# Patient Record
Sex: FEMALE | Race: WHITE | Employment: FULL TIME | URBAN - METROPOLITAN AREA
[De-identification: names, ages, dates, MRNs, and addresses within clinical notes are randomized per-mention and may not be internally consistent; named-entity substitution may affect disease eponyms.]

---

## 2017-09-22 ENCOUNTER — OFFICE VISIT (OUTPATIENT)
Dept: OBGYN | Facility: OTHER | Age: 32
End: 2017-09-22
Payer: COMMERCIAL

## 2017-09-22 VITALS
DIASTOLIC BLOOD PRESSURE: 80 MMHG | HEIGHT: 70 IN | SYSTOLIC BLOOD PRESSURE: 118 MMHG | HEART RATE: 88 BPM | WEIGHT: 216 LBS | BODY MASS INDEX: 30.92 KG/M2

## 2017-09-22 DIAGNOSIS — Z30.432 ENCOUNTER FOR IUD REMOVAL: ICD-10-CM

## 2017-09-22 DIAGNOSIS — Z23 NEED FOR PROPHYLACTIC VACCINATION AND INOCULATION AGAINST INFLUENZA: ICD-10-CM

## 2017-09-22 DIAGNOSIS — Z00.00 ROUTINE GENERAL MEDICAL EXAMINATION AT A HEALTH CARE FACILITY: Primary | ICD-10-CM

## 2017-09-22 PROCEDURE — G0145 SCR C/V CYTO,THINLAYER,RESCR: HCPCS | Performed by: OBSTETRICS & GYNECOLOGY

## 2017-09-22 PROCEDURE — 90686 IIV4 VACC NO PRSV 0.5 ML IM: CPT | Performed by: OBSTETRICS & GYNECOLOGY

## 2017-09-22 PROCEDURE — 90471 IMMUNIZATION ADMIN: CPT | Performed by: OBSTETRICS & GYNECOLOGY

## 2017-09-22 PROCEDURE — 87624 HPV HI-RISK TYP POOLED RSLT: CPT | Performed by: OBSTETRICS & GYNECOLOGY

## 2017-09-22 PROCEDURE — 58301 REMOVE INTRAUTERINE DEVICE: CPT | Performed by: OBSTETRICS & GYNECOLOGY

## 2017-09-22 PROCEDURE — 99395 PREV VISIT EST AGE 18-39: CPT | Mod: 25 | Performed by: OBSTETRICS & GYNECOLOGY

## 2017-09-22 RX ORDER — PRENATAL VIT/IRON FUM/FOLIC AC 27MG-0.8MG
1 TABLET ORAL DAILY
COMMUNITY
End: 2017-09-22

## 2017-09-22 ASSESSMENT — PAIN SCALES - GENERAL: PAINLEVEL: NO PAIN (0)

## 2017-09-22 NOTE — PROGRESS NOTES
Injectable Influenza Immunization Documentation    1.  Is the person to be vaccinated sick today?   No    2. Does the person to be vaccinated have an allergy to a component   of the vaccine?   No    3. Has the person to be vaccinated ever had a serious reaction   to influenza vaccine in the past?   No    4. Has the person to be vaccinated ever had Guillain-Barré syndrome?   No    Form completed by QIANA

## 2017-09-22 NOTE — MR AVS SNAPSHOT
After Visit Summary   9/22/2017    Julianne Jenkins    MRN: [id number]           Patient Information     Date Of Birth          1985        Visit Information        Provider Department      9/22/2017 1:45 PM Swathi Crisostomo, DO [facility]        Today's Diagnoses     Routine general medical examination at a health care facility    -  1    Encounter for IUD removal          Care Instructions    PREVENTIVE HEALTH RECOMMENDATIONS:   Get a physical every year.  A pap test is important to have done starting at age 21 and then every three years as long as your pap is normal.  When you receive the results of your pap test it will include when you need to have your next pap test.    You should be tested each year for chlamydia and gonorrhea if you are aged 16-25 and if you have had a new sexual partner since you were last tested.   Vaccines: Get a flu shot each year.   Eat at least 8-10 servings of fruits and vegetables daily.  Eat whole-grain bread and cereal, whole-wheat pasta and brown rice instead of white grains and white rice.   For bone health: Eat calcium-rich foods (dairy products) or take calcium pills (500 to 600 mg with vitamin D) twice a day with food.   Exercise for an average of 30 minutes a day, 5 days of the week. It can be as simple as taking a brisk walk.  This will help you control your weight and prevent many diseases.   Limit alcohol to one drink per day.   Don't smoke and limit your exposure to second hand smoke.  If you smoke consider making a plan to quit. Go to [employer] and clink on   [employer]  for help   Wear sunscreen with at least SPF15 to prevent skin damage and skin cancer.   Brush your teeth twice a day and floss once a day and see your dentist twice a year for an exam and cleaning.   Have a great year and I will look forward to seeing you next year.   Swathi Crisostomo, DO          Follow-ups after your visit        Follow-up notes from your  "care team     Return in about 1 year (around 9/22/2018).      Who to contact     If you have questions or need follow up information about today's clinic visit or your schedule please contact [facility] directly at [phone].  Normal or non-critical lab and imaging results will be communicated to you by MyChart, letter or phone within 4 business days after the clinic has received the results. If you do not hear from us within 7 days, please contact the clinic through Osmosishart or phone. If you have a critical or abnormal lab result, we will notify you by phone as soon as possible.  Submit refill requests through AppDirect or call your pharmacy and they will forward the refill request to us. Please allow 3 business days for your refill to be completed.          Additional Information About Your Visit        Osmosishart Information     AppDirect gives you secure access to your electronic health record. If you see a primary care provider, you can also send messages to your care team and make appointments. If you have questions, please call your primary care clinic.  If you do not have a primary care provider, please call [phone] and they will assist you.        Care EveryWhere ID     This is your Care EveryWhere ID. This could be used by other organizations to access your [facility] medical records  [id number]        Your Vitals Were     Pulse Height BMI (Body Mass Index)             88 5' 9.5\" (1.765 m) 31.44 kg/m2          Blood Pressure from Last 3 Encounters:   09/22/17 118/80   07/22/16 102/78   08/20/15 124/85    Weight from Last 3 Encounters:   09/22/17 216 lb (98 kg)   07/22/16 205 lb (93 kg)   02/12/15 229 lb (103.9 kg)              We Performed the Following     Pap imaged thin layer screen with HPV - recommended age 30 - 65 years (select HPV order below)     REMOVE INTRAUTERINE DEVICE        Primary Care Provider    None Specified       No primary provider on file.        Equal Access to " Services     [facility]: Hadii aad ku hadlencruzito [employer], waaxda luqadaha, qaybta kaalmamarcus young, augustine puente. So [facility] [phone].    ATENCIÓN: Si habla español, tiene a clayton disposición servicios gratuitos de asistencia lingüística. Llame al [phone].    We comply with applicable federal civil rights laws and [place] laws. We do not discriminate on the basis of race, color, national origin, age, disability sex, sexual orientation or gender identity.            Thank you!     Thank you for choosing [facility]  for your care. Our goal is always to provide you with excellent care. Hearing back from our patients is one way we can continue to improve our services. Please take a few minutes to complete the written survey that you may receive in the mail after your visit with us. Thank you!             Your Updated Medication List - Protect others around you: Learn how to safely use, store and throw away your medicines at www.disposemymeds.org.          This list is accurate as of: 9/22/17  2:27 PM.  Always use your most recent med list.                   Brand Name Dispense Instructions for use Diagnosis    PRENATAL VITAMIN PO           TUMS PO           TYLENOL 325 MG tablet   Generic drug:  acetaminophen      Take 325-650 mg by mouth every 6 hours as needed        valACYclovir 500 MG tablet    VALTREX    90 tablet    Take 1 tablet (500 mg) by mouth daily    Recurrent cold sores       VITAMIN B 12 PO      Take 1,000 mcg by mouth        ZANTAC PO

## 2017-09-22 NOTE — PROGRESS NOTES
Chief Complaint   Patient presents with     Physical     IUD removal--  Pap -  annual diagnostic ??-- offer flu inj       Subjective  Julianne Jenkins is a 32 year old female who presents for her annual exam.  Patient states she is doing well.  She is ready for another baby so wants her Paragard IUD out today.  She has not been able to feel her strings but an ultrasound last year showed the IUD to be in the correct location.  Patient states her menses are heavy for 3 days.  Her menses are regular, monthly.  Her last menstrual period was last week.  No problems urinating.  Normal bowel movements.  No fever or chills.  Patient is sexually active.  No dyspareunia.  No vaginal spotting after.  Patient wants the flu vaccine.      Most recent pap: 2016  History of abnormal Pap smear:  History of leep  History of STI's: No  History of PID:  No    Family history of uterine cancer:  No  Family history of ovarian cancer:  Maternal aunts  Family history of colon cancer:   No  Family history of breast cancer:  No    ROS  ROS: 10 point ROS neg other than the symptoms noted above in the HPI.    Past Medical History:   Diagnosis Date     ASCUS with positive high risk HPV 11/26/13     ASCUS with positive high risk HPV 11/21/14     H/O colposcopy with cervical biopsy 3/5/13    MYNOR 1     H/O colposcopy with cervical biopsy 12/22/14    MYONR 3     LSIL (low grade squamous intraepithelial lesion) on Pap smear 7/19/10     LSIL (low grade squamous intraepithelial lesion) on Pap smear 2/19/13    cannot rule out HSIL     Past Surgical History:   Procedure Laterality Date     APPENDECTOMY       COSMETIC SURGERY  2010    Molars      LEEP TX, CERVICAL  1/13/15    MYNOR 3 without extension to margins     History reviewed. No pertinent family history.  Social History   Substance Use Topics     Smoking status: Former Smoker     Smokeless tobacco: Never Used     Alcohol use No       Tobacco abuse:  No  Do you get at least three servings of calcium  "containing foods daily (dairy, green leafy vegetables, etc.)? yes   Outside of work or daily activities, how many days per week do you exercise for 30 minutes or longer? 3-4x per week  The patient does not drink >3 drinks per day nor >7 drinks per week.   Have you had an eye exam in the past two years? yes   Do you see a dentist twice per year? yes   Today's PHQ-2 Score:   Abuse: Current or Past(Physical, Sexual or Emotional)- No   Do you feel safe in your environment - Yes  Objective  Vitals: /80  Pulse 88  Ht 5' 9.5\" (1.765 m)  Wt 216 lb (98 kg)  BMI 31.44 kg/m2  BMI= Body mass index is 31.44 kg/(m^2).    General appearance=no deformities noted  Psych=mood is stable  Skin=no rashes or lesions seen  Breast:  Benign exam, no masses palpated.  No skin changes, no axillary lymphadenopathy, no nipple discharge.  Axilla feel completely normal, no lymph node enlargement and non-tender.  Neck=overall appearance is normal  Heart=RRR, no murmurs, no swelling noted  Thyroid=normal, no masses, no TTP, no enlargement  Lungs=non-labored breathing, no use of accessory muscles, clear to ausculation bilaterally  Abd=soft, Nontender/nondistended, +bowel sounds x4, no masses, no signs of hernias, no evidence of hepatosplenomegaly  PELVIC:    External genitalia: normal without lesions or masses  Urethral meatus: no lesions or prolapse noted, normal size  Urethra: no masses, non tender  Bladder: non tender, no fullness  Vagina: normal mucosa and rugae, no discharge.  Cervix: normal without lesion, no cervical motion tenderness, healthy, multiparous, pap smear obtained  Uterus: small, mobile, nontender.  Adnexa: non tender, without masses  Rectal: deffered  Ext=no clubbing or cyanosis, no swelling      Last lipid profile: 2016  Regular self breast exam:  Yes  Most recent mammogram:  2016  History of abnormal mammogram:  No  Fit testing:  No  DEXA:  No    Procedure:  Speculum placed.  IUD strings grabbed with ring forceps.  " Removed intact.  No problems encountered.        Assessment/Plan  1.)  Annual/well woman exam=pap smear  2.)  Fertility desired=remove IUD    The following topics were discussed or recommended   Discussed seat belt, helmet and sunscreen use  Vision screening   Calcium/Vitamin D supplement=Recommended 1000 mg of calcium daily and 800 IU of vitamin D.    25 minutes was spent face to face with the patient today discussing her history, diagnosis, and follow-up plan as noted above.  Over 50% of the visit was spent in counseling and coordination of care.    Total Visit Time: 30 minutes        Swathi Crisostomo

## 2017-09-22 NOTE — PATIENT INSTRUCTIONS
PREVENTIVE HEALTH RECOMMENDATIONS:   Get a physical every year.  A pap test is important to have done starting at age 21 and then every three years as long as your pap is normal.  When you receive the results of your pap test it will include when you need to have your next pap test.    You should be tested each year for chlamydia and gonorrhea if you are aged 16-25 and if you have had a new sexual partner since you were last tested.   Vaccines: Get a flu shot each year.   Eat at least 8-10 servings of fruits and vegetables daily.  Eat whole-grain bread and cereal, whole-wheat pasta and brown rice instead of white grains and white rice.   For bone health: Eat calcium-rich foods (dairy products) or take calcium pills (500 to 600 mg with vitamin D) twice a day with food.   Exercise for an average of 30 minutes a day, 5 days of the week. It can be as simple as taking a brisk walk.  This will help you control your weight and prevent many diseases.   Limit alcohol to one drink per day.   Don't smoke and limit your exposure to second hand smoke.  If you smoke consider making a plan to quit. Go to [employer] and clink on   [employer]  for help   Wear sunscreen with at least SPF15 to prevent skin damage and skin cancer.   Brush your teeth twice a day and floss once a day and see your dentist twice a year for an exam and cleaning.   Have a great year and I will look forward to seeing you next year.   Swathi Crisostomo, DO

## 2017-09-22 NOTE — NURSING NOTE
"Chief Complaint   Patient presents with     Physical     IUD removal--  Pap -  annual diagnostic ??-- offer flu inj       Initial /80  Pulse 88  Ht 5' 9.5\" (1.765 m)  Wt 216 lb (98 kg)  BMI 31.44 kg/m2 Estimated body mass index is 31.44 kg/(m^2) as calculated from the following:    Height as of this encounter: 5' 9.5\" (1.765 m).    Weight as of this encounter: 216 lb (98 kg).  Medication Reconciliation:complete  "

## 2017-09-26 LAB
COPATH REPORT: NORMAL
PAP: NORMAL

## 2017-09-28 LAB
FINAL DIAGNOSIS: NORMAL
HPV HR 12 DNA CVX QL NAA+PROBE: NEGATIVE
HPV16 DNA SPEC QL NAA+PROBE: NEGATIVE
HPV18 DNA SPEC QL NAA+PROBE: NEGATIVE
SPECIMEN DESCRIPTION: NORMAL

## 2018-06-07 ENCOUNTER — OFFICE VISIT (OUTPATIENT)
Dept: URGENT CARE | Facility: RETAIL CLINIC | Age: 33
End: 2018-06-07
Payer: COMMERCIAL

## 2018-06-07 VITALS
BODY MASS INDEX: 30.3 KG/M2 | HEART RATE: 76 BPM | SYSTOLIC BLOOD PRESSURE: 120 MMHG | WEIGHT: 208.2 LBS | DIASTOLIC BLOOD PRESSURE: 81 MMHG | TEMPERATURE: 98.9 F

## 2018-06-07 DIAGNOSIS — Z32.01 PREGNANCY TEST POSITIVE: Primary | ICD-10-CM

## 2018-06-07 DIAGNOSIS — N92.6 MISSED MENSES: ICD-10-CM

## 2018-06-07 DIAGNOSIS — R11.0 NAUSEA: ICD-10-CM

## 2018-06-07 LAB — HCG UR QL: POSITIVE

## 2018-06-07 PROCEDURE — 99213 OFFICE O/P EST LOW 20 MIN: CPT | Performed by: PHYSICIAN ASSISTANT

## 2018-06-07 PROCEDURE — 81025 URINE PREGNANCY TEST: CPT | Performed by: PHYSICIAN ASSISTANT

## 2018-06-07 RX ORDER — ONDANSETRON 8 MG/1
TABLET, FILM COATED ORAL
Refills: 0 | COMMUNITY
Start: 2018-04-04 | End: 2018-12-20

## 2018-06-07 RX ORDER — SUMATRIPTAN 50 MG/1
TABLET, FILM COATED ORAL
Refills: 0 | COMMUNITY
Start: 2018-04-04 | End: 2020-01-22

## 2018-06-07 RX ORDER — PROCHLORPERAZINE MALEATE 10 MG
10 TABLET ORAL
COMMUNITY
Start: 2018-04-05 | End: 2018-08-17

## 2018-06-07 NOTE — LETTER
[facility]  [address] [address] [address]      June 7, 2018    [facility]  [address] [place] [address]        To whom it may concern:    Julianne was seen at our clinic today. Please excuse her from work missed June 6 and June 7.        Sincerely,        Marquita Bazzi PA-C

## 2018-06-07 NOTE — PROGRESS NOTES
Chief Complaint   Patient presents with     Nausea     nausea and abdominal cramping, diarrhea sat and , a lot gas, body aches yesterday, no fevers, wants a preganacy test today, may 7th first day of last period     SUBJECTIVE:  Julianne Jenkins is a 32 year old female here today for a pregnancy test.  LMP: May 7, 2018  Wt: 208.2. She and her  have been trying to get pregnant.  Symptoms include: absence of menses and nausea, some abdominal cramping, malodorous flatus, diarrhea.  Pregnancy planning discussed: pre-conception care, nutrition, smoking, alcohol & drug use and pre- vitamins    Past Medical History:   Diagnosis Date     ASCUS with positive high risk HPV 13     ASCUS with positive high risk HPV 14     H/O colposcopy with cervical biopsy 3/5/13    MYNOR 1     H/O colposcopy with cervical biopsy 14    MYNOR 3     LSIL (low grade squamous intraepithelial lesion) on Pap smear 7/19/10     LSIL (low grade squamous intraepithelial lesion) on Pap smear 13    cannot rule out HSIL     Current Outpatient Prescriptions   Medication Sig Dispense Refill     acetaminophen (TYLENOL) 325 MG tablet Take 325-650 mg by mouth every 6 hours as needed       Calcium Carbonate Antacid (TUMS PO)        Cyanocobalamin (VITAMIN B 12 PO) Take 1,000 mcg by mouth       IBUPROFEN PO Take 600 mg by mouth       ondansetron (ZOFRAN) 8 MG tablet   0     Prenatal Vit-Fe Fumarate-FA (PRENATAL VITAMIN PO)        prochlorperazine (COMPAZINE) 10 MG tablet Take 10 mg by mouth       Ranitidine HCl (ZANTAC PO)        SUMAtriptan (IMITREX) 50 MG tablet TK ONE T PO UTD.  MAY REPEAT AFTER 2 H.  MG / 24 H.  0     valACYclovir (VALTREX) 500 MG tablet Take 1 tablet (500 mg) by mouth daily (Patient not taking: Reported on 2018) 90 tablet 3     Social History   Substance Use Topics     Smoking status: Former Smoker     Smokeless tobacco: Never Used     Alcohol use No     Allergies   Allergen Reactions      Vicodin [Hydrocodone-Acetaminophen] Itching     Mood alters     ROS:  Review of systems negative except as stated above.    OBJECTIVE:   /81 (BP Location: Left arm)  Pulse 76  Temp 98.9  F (37.2  C) (Oral)  Wt 208 lb 3.2 oz (94.4 kg)  LMP 2018 (Exact Date)  Breastfeeding? No  BMI 30.3 kg/m2  GENERAL APPEARANCE: healthy, alert and in no distress  RESP: lungs clear to auscultation - no rales, rhonchi or wheezes  CV: regular rates and rhythm, normal S1 S2, no murmur noted    Pregnancy test is positive    ASSESSMENT:    ICD-10-CM    1. Pregnancy test positive Z32.01    2. Nausea R11.0 HCG Qual, Urine-  [facility] ([id number])   3. Missed menses N92.6 HCG Qual, Urine-  [facility] ([id number])     PLAN:   Patient Instructions   CONGRATULATIONS!!  ANDREA: Feb 10, 2019  Make appointment with OB doctor for prenatal care- usually around 10 weeks (July 15)  Start taking a prenatal vitamin daily- take with food as iron may cause nausea on an empty stomach.  Avoid alcohol and cigarettes.  Ask your doctor before taking any medications  Tylenol is ok for pain. Avoid ibuprofen.  Eat a well balanced diet.  Prenatal packet given.    Educational advice given: nutrition, smoking and drugs & alcohol.  Current medications reviewed: Yes  Previous pregnancy history remarkable for: none.  follow-up appointment with OB for pre-shanda care, take multivitamin or pre-shanda vitamins and OB Education packet given.    She is advised to notify a provider immediately if she experiences any severe cramping or abdominal pain or any vaginal bleeding.  Follow up with primary care provider with any problems, questions or concerns. Patient agreed to plan and verbalized understanding.    Lacy Bazzi PA-C  [facility] [place]

## 2018-06-07 NOTE — PATIENT INSTRUCTIONS
CONGRATULATIONS!!  ANDREA: Feb 10, 2019  Make appointment with OB doctor for prenatal care- usually around 10 weeks (July 15)  Start taking a prenatal vitamin daily- take with food as iron may cause nausea on an empty stomach.  Avoid alcohol and cigarettes.  Ask your doctor before taking any medications  Tylenol is ok for pain. Avoid ibuprofen.  Eat a well balanced diet.  Prenatal packet given.

## 2018-06-07 NOTE — MR AVS SNAPSHOT
After Visit Summary   6/7/2018    Julianne Jenkins    MRN: [id number]           Patient Information     Date Of Birth          1985        Visit Information        Provider Department      6/7/2018 10:10 AM Marquita Bazzi PA-C [facility]        Today's Diagnoses     Nausea    -  1      Care Instructions    CONGRATULATIONS!!  ANDREA: Feb 10, 2019  Make appointment with OB doctor for prenatal care- usually around 10 weeks (July 15)  Start taking a prenatal vitamin daily- take with food as iron may cause nausea on an empty stomach.  Avoid alcohol and cigarettes.  Ask your doctor before taking any medications  Tylenol is ok for pain. Avoid ibuprofen.  Eat a well balanced diet.  Prenatal packet given.          Follow-ups after your visit        Who to contact     You can reach your care team any time of the day by calling [phone].  Notification of test results:  If you have an abnormal lab result, we will notify you by phone as soon as possible.         Additional Information About Your Visit        [employer] Information     SeeWhy gives you secure access to your electronic health record. If you see a primary care provider, you can also send messages to your care team and make appointments. If you have questions, please call your primary care clinic.  If you do not have a primary care provider, please call [phone] and they will assist you.        Care EveryWhere ID     This is your Care EveryWhere ID. This could be used by other organizations to access your [facility] medical records  [id number]        Your Vitals Were     Pulse Temperature Last Period Breastfeeding?          76 98.9  F (37.2  C) (Oral) 05/07/2018 (Exact Date) No         Blood Pressure from Last 3 Encounters:   06/07/18 120/81   09/22/17 118/80   07/22/16 102/78    Weight from Last 3 Encounters:   09/22/17 216 lb (98 kg)   07/22/16 205 lb (93 kg)   02/12/15 229 lb (103.9 kg)              We Performed the  Following     HCG Qual, Urine-  [facility] ([id number])        Primary Care Provider Office Phone # Fax #    [facility] [place] [phone]       [facility] [address]  [facility] [address]        Equal Access to Services     [facility] : Hadii aad ku hadleno Soomaali, waaxda luqadaha, qaybta kaalmada adeegyada, waxmillicent kimin hayarmandn kelsie núñeznadinefritz puente. So [facility] [phone].    ATENCIÓN: Si habla español, tiene a clayton disposición servicios gratuitos de asistencia lingüística. Llame al [phone].    We comply with applicable federal civil rights laws and [place] laws. We do not discriminate on the basis of race, color, national origin, age, disability, sex, sexual orientation, or gender identity.            Thank you!     Thank you for choosing [facility]  for your care. Our goal is always to provide you with excellent care. Hearing back from our patients is one way we can continue to improve our services. Please take a few minutes to complete the written survey that you may receive in the mail after your visit with us. Thank you!             Your Updated Medication List - Protect others around you: Learn how to safely use, store and throw away your medicines at www.disposemymeds.org.          This list is accurate as of 6/7/18 10:34 AM.  Always use your most recent med list.                   Brand Name Dispense Instructions for use Diagnosis    IBUPROFEN PO      Take 600 mg by mouth        ondansetron 8 MG tablet    ZOFRAN          PRENATAL VITAMIN PO           prochlorperazine 10 MG tablet    COMPAZINE     Take 10 mg by mouth        SUMAtriptan 50 MG tablet    IMITREX     TK ONE T PO UTD.  MAY REPEAT AFTER 2 H.  MG / 24 H.        TUMS PO           TYLENOL 325 MG tablet   Generic drug:  acetaminophen      Take 325-650 mg by mouth every 6 hours as needed        valACYclovir 500 MG tablet    VALTREX    90 tablet    Take 1 tablet (500 mg) by mouth daily     Recurrent cold sores       VITAMIN B 12 PO      Take 1,000 mcg by mouth        ZANTAC PO

## 2018-06-08 ENCOUNTER — MYC MEDICAL ADVICE (OUTPATIENT)
Dept: OBGYN | Facility: OTHER | Age: 33
End: 2018-06-08

## 2018-07-01 ENCOUNTER — MYC MEDICAL ADVICE (OUTPATIENT)
Dept: OBGYN | Facility: OTHER | Age: 33
End: 2018-07-01

## 2018-07-02 NOTE — TELEPHONE ENCOUNTER
Julianne Jenkins is a 33 year old female who calls with dizziness, vomiting.    NURSING ASSESSMENT:  Description:  Pt is not able to keep fluids or food down.  She is 8 weeks pregnant and feels she may be dehydrated.  Onset/duration:  Friday night   Precip. factors:  unknown  Associated symptoms:  Vomiting, unable to keep food or fluids down, dizzy, weak, only trickles of urine for the last 24 hours, heart palpitations.  Denies chest pain or difficulty breathing.  Improves/worsens symptoms:  Zofran is not helping  Pain scale (0-10)   0/10    Allergies:   Allergies   Allergen Reactions     Vicodin [Hydrocodone-Acetaminophen] Itching     Mood alters         RECOMMENDED DISPOSITION:  To ED, another person to drive - due to signs of dehydration  Will comply with recommendation: Yes  If further questions/concerns or if symptoms do not improve, worsen or new symptoms develop, call your PCP or [facility] as soon as possible.      Guideline used: dizziness  Telephone Triage Protocols for Nurses, Fifth Edition, Lynne Trivedi RN

## 2018-07-12 ENCOUNTER — MYC MEDICAL ADVICE (OUTPATIENT)
Dept: OBGYN | Facility: OTHER | Age: 33
End: 2018-07-12

## 2018-07-12 DIAGNOSIS — O21.9 NAUSEA AND VOMITING IN PREGNANCY: Primary | ICD-10-CM

## 2018-07-12 RX ORDER — ONDANSETRON 8 MG/1
8 TABLET, FILM COATED ORAL EVERY 8 HOURS PRN
Qty: 30 TABLET | Refills: 3 | Status: SHIPPED | OUTPATIENT
Start: 2018-07-12 | End: 2019-01-15

## 2018-07-12 RX ORDER — METOCLOPRAMIDE 5 MG/1
5 TABLET ORAL
Qty: 240 TABLET | Refills: 0 | Status: SHIPPED | OUTPATIENT
Start: 2018-07-12 | End: 2019-05-23

## 2018-07-12 NOTE — TELEPHONE ENCOUNTER
RN pended medication and pharmacy. Routing to [facility] to review and advise on Zofran refill.  Myriam Gama RN, BSN   Responded via [email or web address]. Myriam Gama RN, BSN

## 2018-07-12 NOTE — TELEPHONE ENCOUNTER
Discussed options.  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   She wants to try reglan.  rx sent for zofran to hold for her at the pharmacy if the reglan doesn't work.   She doesn't feel that she needs hydration today.  Yahaira Cornejo, MATTHEW, CNM

## 2018-07-12 NOTE — TELEPHONE ENCOUNTER
ondansetron (ZOFRAN) 8 MG tablet  Routing refill request to provider for review/approval because:  Medication is reported/historical  Myriam Gama RN, BSN       Responded via viVood. Myriam Gama RN, BSN   Provider to review medication request. Myriam Gama RN, BSN

## 2018-07-17 ENCOUNTER — PRENATAL OFFICE VISIT (OUTPATIENT)
Dept: OBGYN | Facility: OTHER | Age: 33
End: 2018-07-17
Payer: COMMERCIAL

## 2018-07-17 VITALS
HEART RATE: 84 BPM | BODY MASS INDEX: 29.85 KG/M2 | DIASTOLIC BLOOD PRESSURE: 76 MMHG | WEIGHT: 208.5 LBS | HEIGHT: 70 IN | SYSTOLIC BLOOD PRESSURE: 124 MMHG

## 2018-07-17 DIAGNOSIS — O09.521 ELDERLY MULTIGRAVIDA IN FIRST TRIMESTER: Primary | ICD-10-CM

## 2018-07-17 DIAGNOSIS — Z23 NEED FOR TDAP VACCINATION: ICD-10-CM

## 2018-07-17 LAB
ABO + RH BLD: NORMAL
ABO + RH BLD: NORMAL
ALBUMIN UR-MCNC: 30 MG/DL
APPEARANCE UR: CLEAR
BILIRUB UR QL STRIP: NEGATIVE
BLD GP AB SCN SERPL QL: NORMAL
BLOOD BANK CMNT PATIENT-IMP: NORMAL
COLOR UR AUTO: YELLOW
ERYTHROCYTE [DISTWIDTH] IN BLOOD BY AUTOMATED COUNT: 12.6 % (ref 10–15)
GLUCOSE UR STRIP-MCNC: 100 MG/DL
HCT VFR BLD AUTO: 38.4 % (ref 35–47)
HGB BLD-MCNC: 13.3 G/DL (ref 11.7–15.7)
HGB UR QL STRIP: NEGATIVE
KETONES UR STRIP-MCNC: NEGATIVE MG/DL
LEUKOCYTE ESTERASE UR QL STRIP: NEGATIVE
MCH RBC QN AUTO: 30.4 PG (ref 26.5–33)
MCHC RBC AUTO-ENTMCNC: 34.6 G/DL (ref 31.5–36.5)
MCV RBC AUTO: 88 FL (ref 78–100)
NITRATE UR QL: NEGATIVE
PH UR STRIP: 7 PH (ref 5–7)
PLATELET # BLD AUTO: 277 10E9/L (ref 150–450)
RBC # BLD AUTO: 4.37 10E12/L (ref 3.8–5.2)
SOURCE: ABNORMAL
SP GR UR STRIP: 1.02 (ref 1–1.03)
SPECIMEN EXP DATE BLD: NORMAL
UROBILINOGEN UR STRIP-ACNC: 0.2 EU/DL (ref 0.2–1)
WBC # BLD AUTO: 9.2 10E9/L (ref 4–11)

## 2018-07-17 PROCEDURE — 99207 ZZC FIRST OB VISIT: CPT | Performed by: ADVANCED PRACTICE MIDWIFE

## 2018-07-17 PROCEDURE — 86901 BLOOD TYPING SEROLOGIC RH(D): CPT | Performed by: ADVANCED PRACTICE MIDWIFE

## 2018-07-17 PROCEDURE — 85027 COMPLETE CBC AUTOMATED: CPT | Performed by: ADVANCED PRACTICE MIDWIFE

## 2018-07-17 PROCEDURE — 36415 COLL VENOUS BLD VENIPUNCTURE: CPT | Performed by: ADVANCED PRACTICE MIDWIFE

## 2018-07-17 PROCEDURE — 81003 URINALYSIS AUTO W/O SCOPE: CPT | Performed by: ADVANCED PRACTICE MIDWIFE

## 2018-07-17 PROCEDURE — 86850 RBC ANTIBODY SCREEN: CPT | Performed by: ADVANCED PRACTICE MIDWIFE

## 2018-07-17 PROCEDURE — 86780 TREPONEMA PALLIDUM: CPT | Performed by: ADVANCED PRACTICE MIDWIFE

## 2018-07-17 PROCEDURE — 86762 RUBELLA ANTIBODY: CPT | Performed by: ADVANCED PRACTICE MIDWIFE

## 2018-07-17 PROCEDURE — 86900 BLOOD TYPING SEROLOGIC ABO: CPT | Performed by: ADVANCED PRACTICE MIDWIFE

## 2018-07-17 PROCEDURE — 87340 HEPATITIS B SURFACE AG IA: CPT | Performed by: ADVANCED PRACTICE MIDWIFE

## 2018-07-17 PROCEDURE — 87389 HIV-1 AG W/HIV-1&-2 AB AG IA: CPT | Performed by: ADVANCED PRACTICE MIDWIFE

## 2018-07-17 PROCEDURE — 87086 URINE CULTURE/COLONY COUNT: CPT | Performed by: ADVANCED PRACTICE MIDWIFE

## 2018-07-17 NOTE — NURSING NOTE
"Chief Complaint   Patient presents with     Prenatal Care       Initial /76 (BP Location: Right arm, Patient Position: Chair, Cuff Size: Adult Regular)  Pulse 84  Ht 5' 9.75\" (1.772 m)  Wt 208 lb 8 oz (94.6 kg)  LMP 05/07/2018 (Exact Date)  BMI 30.13 kg/m2 Estimated body mass index is 30.13 kg/(m^2) as calculated from the following:    Height as of this encounter: 5' 9.75\" (1.772 m).    Weight as of this encounter: 208 lb 8 oz (94.6 kg).  Medication Reconciliation: alley Velarde CMA    "

## 2018-07-17 NOTE — PROGRESS NOTES
Julianne Jenkins is a 33 year old  who presents to the clinic for an new ob visit.   Estimated Date of Delivery: 2019 is calculated from Patient's last menstrual period was 2018 (exact date).       She has not had bleeding since her LMP.   She has had nausea resulting in vomiting but no  weight loss.  Has been in the ED once for fluids and is taking zofran and reglan and controlling her vomiting reasonably well.  However her nausea is significatn  HISTORY  updated and reviewed  Past Medical History:   Diagnosis Date     ASCUS with positive high risk HPV 13     ASCUS with positive high risk HPV 14     H/O colposcopy with cervical biopsy 3/5/13    MYNOR 1     H/O colposcopy with cervical biopsy 14    MYNOR 3     LSIL (low grade squamous intraepithelial lesion) on Pap smear 7/19/10     LSIL (low grade squamous intraepithelial lesion) on Pap smear 13    cannot rule out HSIL     Past Surgical History:   Procedure Laterality Date     APPENDECTOMY       COSMETIC SURGERY      [facility]      LEEP TX, CERVICAL  1/13/15    MYNOR 3 without extension to margins     Social History     Social History     Marital status:      Spouse name: N/A     Number of children: 1     Years of education: N/A     Occupational History      [employer]      Social History Main Topics     Smoking status: Former Smoker     Types: Cigarettes     Quit date: 2018     Smokeless tobacco: Never Used     Alcohol use No     Drug use: No     Sexual activity: Yes     Partners: Male     Birth control/ protection: None     Other Topics Concern     Not on file     Social History Narrative     Health Maintenance   Topic Date Due     PHQ-2 Q1 YR  1995     HIV SCREEN (SYSTEM ASSIGNED)  2001     INFLUENZA VACCINE (1) 2018     PAP Q3 YR  2020     HPV Q3 Years  2020     TETANUS IMMUNIZATION (SYSTEM ASSIGNED)  2024     Family History   Problem Relation Age of Onset     Depression  "Mother      Hypertension Father            REVIEW OF SYSTEMS  CONSTITUTIONAL: NEGATIVE for fever, chills  INTEGUMENTARY/SKIN: NEGATIVE for worrisome rashes, moles or lesions  EYES: NEGATIVE for vision changes   ENT/MOUTH: NEGATIVE for ear, mouth and throat problems  RESP: NEGATIVE for significant cough or SOB  BREAST: NEGATIVE for masses, tenderness or discharge  CV: NEGATIVE for chest pain, palpitations   GI: NEGATIVE for nausea, abdominal pain, heartburn, or change in bowel habits  : NEGATIVE for frequency, dysuria, or hematuria  MUSCULOSKELETAL: NEGATIVE for significant arthralgias or myalgia  NEURO: NEGATIVE for weakness, dizziness or paresthesias or headache  ENDOCRINE: NEGATIVE for temperature intolerance, skin/hair changes  HEME: NEGATIVE for bleeding problems  PSYCHIATRIC: NEGATIVE for changes in mood or affect        PHYSICAL EXAM  /76 (BP Location: Right arm, Patient Position: Chair, Cuff Size: Adult Regular)  Pulse 84  Ht 5' 9.75\" (1.772 m)  Wt 208 lb 8 oz (94.6 kg)  LMP 05/07/2018 (Exact Date)  BMI 30.13 kg/m2  GENERAL:  Pleasant pregnant female, alert, cooperative  and well groomed.  SKIN:  Warm and dry, without lesions or rashes  HEAD: Symmetrical features.  EYES:  PERRLA.  EARS: Tympanic membranes gray, translucent and intact.  MOUTH:  Buccal mucosa pink, moist without lesions.  Teeth in good repair.    NECK:  Thyroid without enlargement and nodules.  Lymph nodes not palpable.   LUNGS:  Clear to auscultation.  BREAST:  Symmetrical.  No dominant, fixed or suspicious masses are noted.  No skin or nipple changes or axillary nodes.    Nipples everted.      HEART:  RRR with  out murmur.  ABDOMEN: Soft without masses , tenderness or organomegaly.  No CVA tenderness.  Uterus not palpable  Bed side ultrasound with single fetus and FHR of 150.  CRL deferred.  MUSCULOSKELETAL:  Full range of motion  EXTREMITIES:  No edema. No significant varicosities.    ASSESSMENT:  Intrauterine pregnancy of " 10w1d  (O09.521) Elderly multigravida in first trimester  (primary encounter diagnosis)  Comment:   Plan: UA without Microscopic, Urine Culture Aerobic         Bacterial, CBC with platelets, HIV Antigen         Antibody Combo, Rubella Antibody IgG         Quantitative, Hepatitis B surface antigen,         Treponema Abs w Reflex to RPR and Titer, US OB         < 14 Weeks Single, PERINATOLOGY REFERRAL,         ABO/Rh type and screen            (Z23) Need for Tdap vaccination  Comment:   Plan:   Declines Gc/CT  Requests MFM consult and second  Trimester scan      PLAN:  Options for  testing for chromosomal and birth defects were discussed with the patient. Diagnostic tests include CVS and Amniocentesis. We discussed that these tests are definitive but invasive and do carry a risk of fetal loss.   Screening tests include nuchal translucency/blood marker testing in the first trimester and quad screening in the second trimester. We discussed that these are screening tests and not diagnostic tests and that false positives and negatives are a distinct possibility.  Requests first trimester  testing.    Instructed on best evidence for: weight gain for her weight and height for pregnancy; healthy diet and foods to avoid; exercise and activity during pregnancy;avoiding exposure to toxoplasmosis; and maintenance of a generally healthy lifestyle.     Intended hospital for [facility] is [facility].    Reviewed transmission of and avoidance strategies for CMV.    Discussed travel cautions.    She had her parnter  have   not traveled to areas currently infected with zika in the past 6 months and currently have  no plans to travel to an area infected with Zika.   If travel plans change they will inform us.          Genetic Screening  At the time of birth, will you be 35 years old or older?  Yes:   Has the patient, baby s father, or anyone in either family had:  Thalassemia (Italian, Greek, Mediterranean, or  background  only) and an MCV result less than 80?  No  Neural tube defect such as meningomyelocele, spina bifida or anencephaly? No  Congenital heart defect? No  Down s syndrome?  No  Sarthak-Sach s disease (Confucianist, Cajun, New Zealander-Linn)? No  Sickle cell disease or trait (Radha)?  No  Muscular dystrophy?  No  Cystic Fibrosis?  No  McCreary s chorea? No  Mental retardation/autism?  No   If yes, was the person tested for fragile X?  No  Any other inherited genetic or chromosomal disorder? No  Maternal metabolic disorder (e.g. insulin-dependent diabetes, PKU)?  No  A child with birth defects not listed above? No  Recurrent pregnancy loss or a stillbirth?  No  Does the patient or baby s father have any other genetic risks? No  Infection History  Do we have your permission to complete routine prenatal lab tests.  This includes Hepatitis B, HIV? Yes:   Do you feel that you are at high risk for coming in contact with the AIDS virus? No  Have you ever been treated for tuberculosis?  No  Have you ever received the BCG vaccine for tuberculosis? No  Do you live with someone who has tuberculosis? No   Have you ever been exposed to tuberculosis?  Yes and has been tested and is negative  Do you have genital herpes?  No  Does your partner have genital herpes?  No  Have you had a rash or viral illness since your last period?  No  Have you ever had Gonorrhea, Chlamydia, Syphilis, venereal warts, trichomoniasis, pelvic inflammatory disease or any other sexually transmitted disease?  Yes: Chlamydia age 19 and HPV with abn paps  Have you had chicken pox?  Yes:   Have you been vaccinated against chicken pox?  No  Have you had any other infectious disease?  No

## 2018-07-17 NOTE — MR AVS SNAPSHOT
After Visit Summary   7/17/2018    Julianne Jenkins    MRN: [id number]           Patient Information     Date Of Birth          1985        Visit Information        Provider Department      7/17/2018 9:00 AM Yahaira Luong APRN [facility]        Today's Diagnoses     Elderly multigravida in first trimester    -  1      Care Instructions    How to prevent CMV or cytomegalovirus infection while you are pregnant:    Thoroughly wash your hands with soap and warm water especially after doing things such as:  Diaper changes  Feeding or bathing a child  Wiping a child's runny nose or drool  Handling a child's toys    Do not share cups, plates, utensils, toothbrushes or food with your children  Do not kiss young children on the mouth or cheek. Instead, kiss them on the head or give them a hug.  Do not share towels or washcloths with young children.  Clean toy, cou.nter tops, and other surfaces that come in contact with urine or saliva.\      LISTERIA  Individuals can reduce the risk for listeria contamination through proper food selection, handling, and storage, such as:  Rinsing raw produce (fuits and vegetables) before eating, cutting, or cooking;  Keeping refrigerators at 40 degrees F or lower;  Buying soft cheeses only if their labels state that they are made with pasteurized milk.  Also avoiding cheese that has not been initially wrapped in plastic.  These cheeses include brie, camembert, blue and the soft Mexican cheeses like con queso.  Heating all food that can be heated but especially hot dogs, luncheon meats, and cold cuts to an internal temperature of 165 degrees F or until steaming hot before serving them; and  Washing your hands for at least 20 seconds with warm water and soap before and after handling cantaloupes or other melons.  Watch for food recalls for listeria and contact us if you believe you have been exposed.               First line remedies for nausea in  pregnancy    Eat small frequent meals every 2-3 hours if possible.   Avoid food at extremes of temparture and drinks with carbination.  Eat foods that appeal to you, avoiding fats and spicy foods.  Bread, pasta, crackers, potatoes, and rice tend to be tolerated the best.  Don't worry about what you eat in the first 3 months, it is more important that you can eat and keep it down.   Try flat ginger ale.  Ginger is a herbal remedy for nausea and you can use it in any form.  There are ginger tablets you can purchase.  The dose is 500 to 1000 mg a day.   You may also try doxylamine 12.5 mg three times a day which is a sleeping medication along with Vitamin B6 25 mg three times a day.  This combination takes up to a week to work so give it some time.   Doxylamine is sometimes called Unisom and it comes in 25 mg tablets so you will have to break it in half and take half a tablet.   If you begin to vomit more than 5 or 6 times a day and feel that you are unable to keep anything down, call the clinic for an appointment to be seen.  [place] [phone].        Thank for choosing our clinic for your health care needs. Our goal is to provided you with excellent care. One way that we continue to improve our care is by listening to our patients. Please take a few minutes to complete the written survey that you may receive in the mail after your visit.     You may reach your care team by calling the following number:    [place]- [phone]   For clinic hours at [facility]  please visit the [place] web site [email or web address]    Notification of your lab results:  Normal or non-critical lab and imaging results will be communicated to you by Mychart, letter, or phone within 7 days. If you do not hear from us within 10 days, please contact us through Mychart or phone. If you have a critical or abnormal lab result, we will notify you by phone as soon as possible.      After Hours nurse advice:  [place] Nurse  Advisors:  [phone]     Medication Refills:  Please contact your pharmacy and they will forward the refill to us. Please allow 3 business days for your refills to be completed.     Secure access to your medical record:  Use [email or web address] (secure email communication and access to your chart) to send your primary care provider a message or make an appointment. Ask someone on your Team how to sign up for [employer]. To log on to Kite or for more information in [employer] please visit the website at [email or web address].        Fruits and vegetables high in pesticide contamination  Strawberries  Spinach  Nectarines  Peaches  Apples  Grapes  Cherries  Pears  Tomatoes  Celery  Potatoes  Sweet Peppers.     Consider extra washing of these fruits and vegetables, peeling if possible or purchasing organics.     Fruits and vegetables lowest if pesticide contramination:  Avocado  Sweet corn  Pineapple  Cabbage   Onions   Frozen peas  Papaya  Asparagus  Reed  Eggplant  Honeydew  Kiwi  Cantaloupe  Cauliflower  Broccoli                  Follow-ups after your visit        Future tests that were ordered for you today     Open Future Orders        Priority Expected Expires Ordered    US OB < 14 Weeks Single Routine  9/15/2018 7/17/2018            Who to contact     If you have questions or need follow up information about today's clinic visit or your schedule please contact [facility] directly at [phone].  Normal or non-critical lab and imaging results will be communicated to you by MyChart, letter or phone within 4 business days after the clinic has received the results. If you do not hear from us within 7 days, please contact the clinic through MyChart or phone. If you have a critical or abnormal lab result, we will notify you by phone as soon as possible.  Submit refill requests through Innobits or call your pharmacy and they will forward the refill request to us. Please allow 3 business days for your refill to  "be completed.          Additional Information About Your Visit        GoodAppetitohart Information     [employer] gives you secure access to your electronic health record. If you see a primary care provider, you can also send messages to your care team and make appointments. If you have questions, please call your primary care clinic.  If you do not have a primary care provider, please call [phone] and they will assist you.        Care EveryWhere ID     This is your Care EveryWhere ID. This could be used by other organizations to access your [facility] medical records  [id number]        Your Vitals Were     Pulse Height Last Period BMI (Body Mass Index)          84 5' 9.75\" (1.772 m) 05/07/2018 (Exact Date) 30.13 kg/m2         Blood Pressure from Last 3 Encounters:   07/17/18 124/76   06/07/18 120/81   09/22/17 118/80    Weight from Last 3 Encounters:   07/17/18 208 lb 8 oz (94.6 kg)   06/07/18 208 lb 3.2 oz (94.4 kg)   09/22/17 216 lb (98 kg)              We Performed the Following     CBC with platelets     Hepatitis B surface antigen     HIV Antigen Antibody Combo     Rubella Antibody IgG Quantitative     Treponema Abs w Reflex to RPR and Titer     UA without Microscopic     Urine Culture Aerobic Bacterial        Primary Care Provider Fax #    Physician No Ref-Primary [phone]       No address on file        Equal Access to Services     MARTÍN PERES : Hadii aad ku hadasho Soomaali, waaxda luqadaha, qaybta kaalmada adeegyada, augustine dykes haytimmy walker . So [facility] [phone].    ATENCIÓN: Si habla español, tiene a clayton disposición servicios gratuitos de asistencia lingüística. Llame al [phone].    We comply with applicable federal civil rights laws and [place] laws. We do not discriminate on the basis of race, color, national origin, age, disability, sex, sexual orientation, or gender identity.            Thank you!     Thank you for choosing [facility]  for your care. Our goal is " always to provide you with excellent care. Hearing back from our patients is one way we can continue to improve our services. Please take a few minutes to complete the written survey that you may receive in the mail after your visit with us. Thank you!             Your Updated Medication List - Protect others around you: Learn how to safely use, store and throw away your medicines at www.disposemymeds.org.          This list is accurate as of 7/17/18  9:42 AM.  Always use your most recent med list.                   Brand Name Dispense Instructions for use Diagnosis    IBUPROFEN PO      Take 600 mg by mouth        metoclopramide 5 MG tablet    REGLAN    240 tablet    Take 1 tablet (5 mg) by mouth 4 times daily (before meals and nightly)    Nausea and vomiting in pregnancy       * ondansetron 8 MG tablet    ZOFRAN          * ondansetron 8 MG tablet    ZOFRAN    30 tablet    Take 1 tablet (8 mg) by mouth every 8 hours as needed for nausea    Nausea and vomiting in pregnancy       PRENATAL VITAMIN PO           prochlorperazine 10 MG tablet    COMPAZINE     Take 10 mg by mouth        SUMAtriptan 50 MG tablet    IMITREX     TK ONE T PO UTD.  MAY REPEAT AFTER 2 H.  MG / 24 H.        TUMS PO           TYLENOL 325 MG tablet   Generic drug:  acetaminophen      Take 325-650 mg by mouth every 6 hours as needed        valACYclovir 500 MG tablet    VALTREX    90 tablet    Take 1 tablet (500 mg) by mouth daily    Recurrent cold sores       VITAMIN B 12 PO      Take 1,000 mcg by mouth        ZANTAC PO           * Notice:  This list has 2 medication(s) that are the same as other medications prescribed for you. Read the directions carefully, and ask your doctor or other care provider to review them with you.

## 2018-07-17 NOTE — PATIENT INSTRUCTIONS
How to prevent CMV or cytomegalovirus infection while you are pregnant:    Thoroughly wash your hands with soap and warm water especially after doing things such as:  Diaper changes  Feeding or bathing a child  Wiping a child's runny nose or drool  Handling a child's toys    Do not share cups, plates, utensils, toothbrushes or food with your children  Do not kiss young children on the mouth or cheek. Instead, kiss them on the head or give them a hug.  Do not share towels or washcloths with young children.  Clean toy, cou.nter tops, and other surfaces that come in contact with urine or saliva.\      LISTERIA  Individuals can reduce the risk for listeria contamination through proper food selection, handling, and storage, such as:  Rinsing raw produce (fuits and vegetables) before eating, cutting, or cooking;  Keeping refrigerators at 40 degrees F or lower;  Buying soft cheeses only if their labels state that they are made with pasteurized milk.  Also avoiding cheese that has not been initially wrapped in plastic.  These cheeses include brie, camembert, blue and the soft Mexican cheeses like con queso.  Heating all food that can be heated but especially hot dogs, luncheon meats, and cold cuts to an internal temperature of 165 degrees F or until steaming hot before serving them; and  Washing your hands for at least 20 seconds with warm water and soap before and after handling cantaloupes or other melons.  Watch for food recalls for listeria and contact us if you believe you have been exposed.               First line remedies for nausea in pregnancy    Eat small frequent meals every 2-3 hours if possible.   Avoid food at extremes of temparture and drinks with carbination.  Eat foods that appeal to you, avoiding fats and spicy foods.  Bread, pasta, crackers, potatoes, and rice tend to be tolerated the best.  Don't worry about what you eat in the first 3 months, it is more important that you can eat and keep it down.    Try flat ginger ale.  Ginger is a herbal remedy for nausea and you can use it in any form.  There are ginger tablets you can purchase.  The dose is 500 to 1000 mg a day.   You may also try doxylamine 12.5 mg three times a day which is a sleeping medication along with Vitamin B6 25 mg three times a day.  This combination takes up to a week to work so give it some time.   Doxylamine is sometimes called Unisom and it comes in 25 mg tablets so you will have to break it in half and take half a tablet.   If you begin to vomit more than 5 or 6 times a day and feel that you are unable to keep anything down, call the clinic for an appointment to be seen.  [place] [phone].        Thank for choosing our clinic for your health care needs. Our goal is to provided you with excellent care. One way that we continue to improve our care is by listening to our patients. Please take a few minutes to complete the written survey that you may receive in the mail after your visit.     You may reach your care team by calling the following number:    [place]- [phone]   For clinic hours at [facility]  please visit the [place] web site [email or web address]    Notification of your lab results:  Normal or non-critical lab and imaging results will be communicated to you by [employer]col[employer]hart, letter, or phone within 7 days. If you do not hear from us within 10 days, please contact us through [employer] or phone. If you have a critical or abnormal lab result, we will notify you by phone as soon as possible.      After Hours nurse advice:  [place] Nurse Advisors:  [phone]     Medication Refills:  Please contact your pharmacy and they will forward the refill to us. Please allow 3 business days for your refills to be completed.     Secure access to your medical record:  Use [employer] (secure email communication and access to your chart) to send your primary care provider a message or make an appointment. Ask someone on your Team how  to sign up for [employer]. To log on to [email or web address] or for more information in [employer] please visit the website at [email or web address].        Fruits and vegetables high in pesticide contamination  Strawberries  Spinach  Nectarines  Peaches  Apples  Grapes  Cherries  Pears  Tomatoes  Celery  Potatoes  Sweet Peppers.     Consider extra washing of these fruits and vegetables, peeling if possible or purchasing organics.     Fruits and vegetables lowest if pesticide contramination:  Avocado  Sweet corn  Pineapple  Cabbage   Onions   Frozen peas  Papaya  Asparagus  Timi  Eggplant  Honeydew  Kiwi  Cantaloupe  Cauliflower  Broccoli

## 2018-07-18 LAB
BACTERIA SPEC CULT: NORMAL
HBV SURFACE AG SERPL QL IA: NONREACTIVE
HIV 1+2 AB+HIV1 P24 AG SERPL QL IA: NONREACTIVE
Lab: NORMAL
RUBV IGG SERPL IA-ACNC: 22 IU/ML
SPECIMEN SOURCE: NORMAL
T PALLIDUM AB SER QL: NONREACTIVE

## 2018-07-18 ASSESSMENT — PATIENT HEALTH QUESTIONNAIRE - PHQ9: SUM OF ALL RESPONSES TO PHQ QUESTIONS 1-9: 1

## 2018-08-08 ENCOUNTER — TRANSFERRED RECORDS (OUTPATIENT)
Dept: HEALTH INFORMATION MANAGEMENT | Facility: CLINIC | Age: 33
End: 2018-08-08

## 2018-08-17 ENCOUNTER — PRENATAL OFFICE VISIT (OUTPATIENT)
Dept: OBGYN | Facility: CLINIC | Age: 33
End: 2018-08-17
Payer: COMMERCIAL

## 2018-08-17 VITALS
BODY MASS INDEX: 31.13 KG/M2 | DIASTOLIC BLOOD PRESSURE: 76 MMHG | SYSTOLIC BLOOD PRESSURE: 108 MMHG | HEART RATE: 79 BPM | OXYGEN SATURATION: 98 % | WEIGHT: 215.4 LBS

## 2018-08-17 DIAGNOSIS — Z87.59 HISTORY OF GESTATIONAL HYPERTENSION: ICD-10-CM

## 2018-08-17 DIAGNOSIS — Z98.890 S/P LEEP (STATUS POST LOOP ELECTROSURGICAL EXCISION PROCEDURE): ICD-10-CM

## 2018-08-17 DIAGNOSIS — Z34.91 NORMAL PREGNANCY IN FIRST TRIMESTER: ICD-10-CM

## 2018-08-17 DIAGNOSIS — O09.522 ELDERLY MULTIGRAVIDA IN SECOND TRIMESTER: Primary | ICD-10-CM

## 2018-08-17 LAB
ALBUMIN SERPL-MCNC: 3.4 G/DL (ref 3.4–5)
ALP SERPL-CCNC: 47 U/L (ref 40–150)
ALT SERPL W P-5'-P-CCNC: 56 U/L (ref 0–50)
ANION GAP SERPL CALCULATED.3IONS-SCNC: 8 MMOL/L (ref 3–14)
AST SERPL W P-5'-P-CCNC: 29 U/L (ref 0–45)
BILIRUB SERPL-MCNC: 0.4 MG/DL (ref 0.2–1.3)
BUN SERPL-MCNC: 10 MG/DL (ref 7–30)
CALCIUM SERPL-MCNC: 8.9 MG/DL (ref 8.5–10.1)
CHLORIDE SERPL-SCNC: 101 MMOL/L (ref 94–109)
CO2 SERPL-SCNC: 26 MMOL/L (ref 20–32)
CREAT SERPL-MCNC: 0.57 MG/DL (ref 0.52–1.04)
GFR SERPL CREATININE-BSD FRML MDRD: >90 ML/MIN/1.7M2
GLUCOSE SERPL-MCNC: 91 MG/DL (ref 70–99)
POTASSIUM SERPL-SCNC: 3.5 MMOL/L (ref 3.4–5.3)
PROT SERPL-MCNC: 6.8 G/DL (ref 6.8–8.8)
PROT UR-MCNC: 0.06 G/L
PROT/CREAT 24H UR: 0.09 G/G CR (ref 0–0.2)
SODIUM SERPL-SCNC: 135 MMOL/L (ref 133–144)

## 2018-08-17 PROCEDURE — 80053 COMPREHEN METABOLIC PANEL: CPT | Performed by: OBSTETRICS & GYNECOLOGY

## 2018-08-17 PROCEDURE — 36415 COLL VENOUS BLD VENIPUNCTURE: CPT | Performed by: OBSTETRICS & GYNECOLOGY

## 2018-08-17 PROCEDURE — 99207 ZZC PRENATAL VISIT: CPT | Performed by: OBSTETRICS & GYNECOLOGY

## 2018-08-17 PROCEDURE — 84156 ASSAY OF PROTEIN URINE: CPT | Performed by: OBSTETRICS & GYNECOLOGY

## 2018-08-17 NOTE — PROGRESS NOTES
33 year old  at 14w4d weeks presents to the clinic for a routine prenatal visit.  Feeling well.  N/V however improving slightly.  No vaginal bleeding, leakage of fluid, or contractions   Fundal height=s=d  BADo=967  Discussed AFP and she declines  AMA=anatomy ultrasound scheduled for  with MFM  RTC 4 weeks.    Swathi Crisostomo

## 2018-08-17 NOTE — MR AVS SNAPSHOT
After Visit Summary   8/17/2018    Julianne Jenkins    MRN: [id number]           Patient Information     Date Of Birth          1985        Visit Information        Provider Department      8/17/2018 8:15 AM Swathi Crisostomo,  [facility]        Today's Diagnoses     Elderly multigravida in second trimester    -  1    History of gestational hypertension        S/P LEEP (status post loop electrosurgical excision procedure)- MYNOR 3        Normal pregnancy in first trimester          Care Instructions    Prenatal Care  What is prenatal care?   Prenatal care is the care you receive when you are pregnant. It includes care given by your healthcare provider, support from your family, and an extra focus on giving yourself the care you need during this special time. Prenatal care improves your chances for a healthy pregnancy and healthy baby.   When should I see my healthcare provider?   Good care during pregnancy includes regularly scheduled prenatal exams.   If you are not yet pregnant but planning to get pregnant in the next few months, see your provider. Your provider may do some tests and talk about things you can do to have a healthy pregnancy and healthy baby.   You should schedule your first prenatal visit with your provider as soon as you think or know you are pregnant. Depending on your health and health history, your provider will probably schedule visits at least once a month for the first 6 months. During the 7th and 8th months you will see your provider every 2 weeks. During the last month you will probably see your provider once a week until you deliver your baby. If your pregnancy is high risk, your provider will probably want to see you more often. In some cases your provider may refer you to a medical specialist for more help with special needs, such as diabetes.   At each visit your healthcare provider will check to make sure that you and the baby are healthy. Regular  visits can help you and your provider prevent possible problems. They can also help your provider find and treat any problems early. In addition to meeting your medical needs, your provider advise you about caring for yourself. You will talk about how to have a healthy diet and get plenty of exercise and rest. Your provider can also help you deal with the emotional changes that can happen during pregnancy.   What will happen at the first prenatal visit?   At your first visit, your provider will ask about your personal medical history. He or she will also ask about the baby's father and your family health history. This information can help give your provider an idea of any problems you might have during your pregnancy. You will have a physical exam, including checks of your height, weight, and blood pressure and a pelvic exam. You will have a Pap test, urine tests, blood tests, and cultures of the cervix and vagina to check for infection.   Your provider will calculate your due date and the age of your baby. If your periods were regular before you got pregnant, and you are sure of the day when your last period started, your due date will be estimated to be 40 weeks from that day.   Your provider will talk to you about how to stay healthy during your pregnancy.   What will happen at other prenatal visits?   Your provider will check how you are doing and how the baby is developing. He or she will discuss how you are feeling, ask if you have any problems, and answer your questions.   During each prenatal visit your provider will:   weigh you   take your blood pressure   check your urine for sugar, protein, or bacteria   check your face, hands, ankles, and feet for swelling   listen to the baby's heartbeat   measure the size of the uterus to check the baby's growth.   At different times during the pregnancy, other exams and tests may be done. Some are routine and others are done only when a problem is suspected or you  have a risk factor for a problem. Examples of other tests you might have are:   tests to check for genetic problems and some birth defects, such as:   chorionic villus sampling of cells from the placenta   amniocentesis to test the fluid around the baby   blood tests called triple or quad screens   ultrasound scans to check the baby's growth, development, and health and to look at your uterus, the amniotic sac, and the placenta   blood tests to check for diabetes   electronic monitoring to check the health of the baby.   How can I take care of myself during my pregnancy?   Here are some things you can do to take good care of yourself during your pregnancy and prepare for the birth of your child:   Keep all appointments with your healthcare provider. Use these visits to discuss your pregnancy concerns or problems. Write down questions before each visit so that you will not forget about things you want to talk about.   Eat healthy meals that include whole grains, fresh fruits and vegetables, and calcium-rich foods, such as milk, cheese, and yogurt. Choose foods low in saturated fat. Do not eat uncooked or undercooked meats or fish.   Avoid certain fish with high levels of mercury. These fish include shark, lisandra mackerel, swordfish, and tilefish. Do not eat more than 12 ounces of fish per week, or no more than 6 ounces of tuna fish per week. Because albacore tuna fish is also high in mercury, choose light tuna.   Drink plenty of water each day.   Take vitamins, other supplements, and medicines as recommended by your provider.   Unless your healthcare provider tells you not to, try to be physically active for at least 30 minutes a day, most days of the week. If you are pressed for time, you might find it easier to exercise 10 minutes at a time, 3 times a day. Consider taking a prenatal exercise class.   Do not smoke, do not drink alcohol, and do not take illegal drugs.   Talk to your healthcare provider before you take  any medicine, including nonprescription and herbal medicines. Some medicines are not safe during pregnancy.   Avoid hot tubs or saunas.   If you have cats in your home, do not empty the cat litter while you are pregnant. It may contain a parasite that causes an infection called toxoplasmosis, which can cause birth defects. Also, use gloves when you work in garden areas used by cats.   Stay away from toxic chemicals like insecticides, solvents (such as some  or paint thinners), lead, and mercury. Check labels on household products. Most dangerous products have pregnancy warnings on their labels. Ask your healthcare provider about products if you are unsure.   Relax by taking breaks from work or chores.   Help reduce stress by sharing your feelings with others.   Report any violence or other types of abuse in your home.   Learn more about pregnancy, labor, and delivery. Read books, watch videos, go to a childbirth class, and talk with experienced moms.   Plan for the lifestyle changes a new baby will bring. Prepare for possible changes in your budget, work situation, daily schedule, and relationships with family and friends.   Talk to your provider about the pros and cons of breast-feeding.   Before and during your pregnancy, try to do everything you can to keep yourself and your baby healthy during your pregnancy.     Published by [employer].  This content is reviewed periodically and is subject to change as new health information becomes available. The information is intended to inform and educate and is not a replacement for medical evaluation, advice, diagnosis or treatment by a healthcare professional.   Developed by [employer].   ? 2010 [facility] and/or its affiliates. All Rights Reserved.   Copyright   Clinical Reference Systems 2011              Follow-ups after your visit        Follow-up notes from your care team     Return in about 4 weeks (around 9/14/2018).      Who to contact     If you have  questions or need follow up information about today's clinic visit or your schedule please contact [facility] directly at [phone].  Normal or non-critical lab and imaging results will be communicated to you by [employer], letter or phone within 4 business days after the clinic has received the results. If you do not hear from us within 7 days, please contact the clinic through [employer] or phone. If you have a critical or abnormal lab result, we will notify you by phone as soon as possible.  Submit refill requests through [employer] or call your pharmacy and they will forward the refill request to us. Please allow 3 business days for your refill to be completed.          Additional Information About Your Visit        [employer] Information     [employer] gives you secure access to your electronic health record. If you see a primary care provider, you can also send messages to your care team and make appointments. If you have questions, please call your primary care clinic.  If you do not have a primary care provider, please call [phone] and they will assist you.        Care EveryWhere ID     This is your Care EveryWhere ID. This could be used by other organizations to access your [place] medical records  [id number]        Your Vitals Were     Pulse Last Period Pulse Oximetry Breastfeeding? BMI (Body Mass Index)       79 05/07/2018 (Exact Date) 98% No 31.13 kg/m2        Blood Pressure from Last 3 Encounters:   08/17/18 108/76   07/17/18 124/76   06/07/18 120/81    Weight from Last 3 Encounters:   08/17/18 215 lb 6.4 oz (97.7 kg)   07/17/18 208 lb 8 oz (94.6 kg)   06/07/18 208 lb 3.2 oz (94.4 kg)              We Performed the Following     Comprehensive metabolic panel     Protein  random urine with Creat Ratio          Today's Medication Changes          These changes are accurate as of 8/17/18  8:47 AM.  If you have any questions, ask your nurse or doctor.               Stop taking these medicines if you  haven't already. Please contact your care team if you have questions.     prochlorperazine 10 MG tablet   Commonly known as:  COMPAZINE   Stopped by:  Swathi Crisostomo DO                    Primary Care Provider Fax #    Physician No Ref-Primary [phone]       No address on file        Equal Access to Services     MARTÍN JA : Hadii mickie ku hadasho Soomaali, waaxda luqadaha, qaybta kaalmada adeegyada, waxay donis brendenlee iglesias nahumfritz walker . So [facility] [phone].    ATENCIÓN: Si habla español, tiene a clayton disposición servicios gratuitos de asistencia lingüística. [facility] [phone].    We comply with applicable federal civil rights laws and [place] laws. We do not discriminate on the basis of race, color, national origin, age, disability, sex, sexual orientation, or gender identity.            Thank you!     Thank you for choosing [facility]  for your care. Our goal is always to provide you with excellent care. Hearing back from our patients is one way we can continue to improve our services. Please take a few minutes to complete the written survey that you may receive in the mail after your visit with us. Thank you!             Your Updated Medication List - Protect others around you: Learn how to safely use, store and throw away your medicines at www.disposemymeds.org.          This list is accurate as of 8/17/18  8:47 AM.  Always use your most recent med list.                   Brand Name Dispense Instructions for use Diagnosis    IBUPROFEN PO      Take 600 mg by mouth        metoclopramide 5 MG tablet    REGLAN    240 tablet    Take 1 tablet (5 mg) by mouth 4 times daily (before meals and nightly)    Nausea and vomiting in pregnancy       * ondansetron 8 MG tablet    ZOFRAN          * ondansetron 8 MG tablet    ZOFRAN    30 tablet    Take 1 tablet (8 mg) by mouth every 8 hours as needed for nausea    Nausea and vomiting in pregnancy       PRENATAL VITAMIN PO           SUMAtriptan 50  MG tablet    IMITREX     TK ONE T PO UTD.  MAY REPEAT AFTER 2 H.  MG / 24 H.        TUMS PO           TYLENOL 325 MG tablet   Generic drug:  acetaminophen      Take 325-650 mg by mouth every 6 hours as needed        valACYclovir 500 MG tablet    VALTREX    90 tablet    Take 1 tablet (500 mg) by mouth daily    Recurrent cold sores       VITAMIN B 12 PO      Take 1,000 mcg by mouth        VITAMIN B6 PO           ZANTAC PO           * Notice:  This list has 2 medication(s) that are the same as other medications prescribed for you. Read the directions carefully, and ask your doctor or other care provider to review them with you.

## 2018-08-17 NOTE — PATIENT INSTRUCTIONS
Prenatal Care  What is prenatal care?   Prenatal care is the care you receive when you are pregnant. It includes care given by your healthcare provider, support from your family, and an extra focus on giving yourself the care you need during this special time. Prenatal care improves your chances for a healthy pregnancy and healthy baby.   When should I see my healthcare provider?   Good care during pregnancy includes regularly scheduled prenatal exams.   If you are not yet pregnant but planning to get pregnant in the next few months, see your provider. Your provider may do some tests and talk about things you can do to have a healthy pregnancy and healthy baby.   You should schedule your first prenatal visit with your provider as soon as you think or know you are pregnant. Depending on your health and health history, your provider will probably schedule visits at least once a month for the first 6 months. During the 7th and 8th months you will see your provider every 2 weeks. During the last month you will probably see your provider once a week until you deliver your baby. If your pregnancy is high risk, your provider will probably want to see you more often. In some cases your provider may refer you to a medical specialist for more help with special needs, such as diabetes.   At each visit your healthcare provider will check to make sure that you and the baby are healthy. Regular visits can help you and your provider prevent possible problems. They can also help your provider find and treat any problems early. In addition to meeting your medical needs, your provider advise you about caring for yourself. You will talk about how to have a healthy diet and get plenty of exercise and rest. Your provider can also help you deal with the emotional changes that can happen during pregnancy.   What will happen at the first prenatal visit?   At your first visit, your provider will ask about your personal medical history. He  or she will also ask about the baby's father and your family health history. This information can help give your provider an idea of any problems you might have during your pregnancy. You will have a physical exam, including checks of your height, weight, and blood pressure and a pelvic exam. You will have a Pap test, urine tests, blood tests, and cultures of the cervix and vagina to check for infection.   Your provider will calculate your due date and the age of your baby. If your periods were regular before you got pregnant, and you are sure of the day when your last period started, your due date will be estimated to be 40 weeks from that day.   Your provider will talk to you about how to stay healthy during your pregnancy.   What will happen at other prenatal visits?   Your provider will check how you are doing and how the baby is developing. He or she will discuss how you are feeling, ask if you have any problems, and answer your questions.   During each prenatal visit your provider will:   weigh you   take your blood pressure   check your urine for sugar, protein, or bacteria   check your face, hands, ankles, and feet for swelling   listen to the baby's heartbeat   measure the size of the uterus to check the baby's growth.   At different times during the pregnancy, other exams and tests may be done. Some are routine and others are done only when a problem is suspected or you have a risk factor for a problem. Examples of other tests you might have are:   tests to check for genetic problems and some birth defects, such as:   chorionic villus sampling of cells from the placenta   amniocentesis to test the fluid around the baby   blood tests called triple or quad screens   ultrasound scans to check the baby's growth, development, and health and to look at your uterus, the amniotic sac, and the placenta   blood tests to check for diabetes   electronic monitoring to check the health of the baby.   How can I take care  of myself during my pregnancy?   Here are some things you can do to take good care of yourself during your pregnancy and prepare for the birth of your child:   Keep all appointments with your healthcare provider. Use these visits to discuss your pregnancy concerns or problems. Write down questions before each visit so that you will not forget about things you want to talk about.   Eat healthy meals that include whole grains, fresh fruits and vegetables, and calcium-rich foods, such as milk, cheese, and yogurt. Choose foods low in saturated fat. Do not eat uncooked or undercooked meats or fish.   Avoid certain fish with high levels of mercury. These fish include shark, lisandra mackerel, swordfish, and tilefish. Do not eat more than 12 ounces of fish per week, or no more than 6 ounces of tuna fish per week. Because albacore tuna fish is also high in mercury, choose light tuna.   Drink plenty of water each day.   Take vitamins, other supplements, and medicines as recommended by your provider.   Unless your healthcare provider tells you not to, try to be physically active for at least 30 minutes a day, most days of the week. If you are pressed for time, you might find it easier to exercise 10 minutes at a time, 3 times a day. Consider taking a prenatal exercise class.   Do not smoke, do not drink alcohol, and do not take illegal drugs.   Talk to your healthcare provider before you take any medicine, including nonprescription and herbal medicines. Some medicines are not safe during pregnancy.   Avoid hot tubs or saunas.   If you have cats in your home, do not empty the cat litter while you are pregnant. It may contain a parasite that causes an infection called toxoplasmosis, which can cause birth defects. Also, use gloves when you work in garden areas used by cats.   Stay away from toxic chemicals like insecticides, solvents (such as some  or paint thinners), lead, and mercury. Check labels on household products.  Most dangerous products have pregnancy warnings on their labels. Ask your healthcare provider about products if you are unsure.   Relax by taking breaks from work or chores.   Help reduce stress by sharing your feelings with others.   Report any violence or other types of abuse in your home.   Learn more about pregnancy, labor, and delivery. Read books, watch videos, go to a childbirth class, and talk with experienced moms.   Plan for the lifestyle changes a new baby will bring. Prepare for possible changes in your budget, work situation, daily schedule, and relationships with family and friends.   Talk to your provider about the pros and cons of breast-feeding.   Before and during your pregnancy, try to do everything you can to keep yourself and your baby healthy during your pregnancy.     Published by [employer].  This content is reviewed periodically and is subject to change as new health information becomes available. The information is intended to inform and educate and is not a replacement for medical evaluation, advice, diagnosis or treatment by a healthcare professional.   Developed by [employer].   ? 2010 [employer] and/or its affiliates. All Rights Reserved.   Copyright   Clinical Reference Systems 2011

## 2018-08-20 DIAGNOSIS — R74.8 ELEVATED LIVER ENZYMES: Primary | ICD-10-CM

## 2018-09-05 DIAGNOSIS — R74.8 ELEVATED LIVER ENZYMES: ICD-10-CM

## 2018-09-05 LAB — ALT SERPL W P-5'-P-CCNC: 46 U/L (ref 0–50)

## 2018-09-05 PROCEDURE — 84460 ALANINE AMINO (ALT) (SGPT): CPT | Performed by: OBSTETRICS & GYNECOLOGY

## 2018-09-05 PROCEDURE — 36415 COLL VENOUS BLD VENIPUNCTURE: CPT | Performed by: OBSTETRICS & GYNECOLOGY

## 2018-09-17 ENCOUNTER — PRENATAL OFFICE VISIT (OUTPATIENT)
Dept: OBGYN | Facility: OTHER | Age: 33
End: 2018-09-17
Payer: COMMERCIAL

## 2018-09-17 VITALS
DIASTOLIC BLOOD PRESSURE: 60 MMHG | SYSTOLIC BLOOD PRESSURE: 110 MMHG | HEART RATE: 96 BPM | WEIGHT: 213.25 LBS | BODY MASS INDEX: 30.82 KG/M2

## 2018-09-17 DIAGNOSIS — Z87.59 HISTORY OF GESTATIONAL HYPERTENSION: ICD-10-CM

## 2018-09-17 DIAGNOSIS — Z98.890 S/P LEEP (STATUS POST LOOP ELECTROSURGICAL EXCISION PROCEDURE): ICD-10-CM

## 2018-09-17 DIAGNOSIS — Z23 NEED FOR PROPHYLACTIC VACCINATION AND INOCULATION AGAINST INFLUENZA: ICD-10-CM

## 2018-09-17 DIAGNOSIS — O09.522 ELDERLY MULTIGRAVIDA IN SECOND TRIMESTER: Primary | ICD-10-CM

## 2018-09-17 DIAGNOSIS — K21.9 GASTROESOPHAGEAL REFLUX DISEASE WITHOUT ESOPHAGITIS: ICD-10-CM

## 2018-09-17 PROCEDURE — 99207 ZZC PRENATAL VISIT: CPT | Performed by: OBSTETRICS & GYNECOLOGY

## 2018-09-17 PROCEDURE — 90471 IMMUNIZATION ADMIN: CPT | Performed by: OBSTETRICS & GYNECOLOGY

## 2018-09-17 PROCEDURE — 90686 IIV4 VACC NO PRSV 0.5 ML IM: CPT | Performed by: OBSTETRICS & GYNECOLOGY

## 2018-09-17 NOTE — PROGRESS NOTES

## 2018-09-17 NOTE — MR AVS SNAPSHOT
After Visit Summary   9/17/2018    Julianne Jenkins    MRN: [id number]           Patient Information     Date Of Birth          1985        Visit Information        Provider Department      9/17/2018 9:00 AM Swathi Crisostomo,  [facility]        Today's Diagnoses     Elderly multigravida in second trimester    -  1    History of gestational hypertension        Gastroesophageal reflux disease without esophagitis        S/P LEEP (status post loop electrosurgical excision procedure)- MYNOR 3          Care Instructions    Prenatal Care  What is prenatal care?   Prenatal care is the care you receive when you are pregnant. It includes care given by your healthcare provider, support from your family, and an extra focus on giving yourself the care you need during this special time. Prenatal care improves your chances for a healthy pregnancy and healthy baby.   When should I see my healthcare provider?   Good care during pregnancy includes regularly scheduled prenatal exams.   If you are not yet pregnant but planning to get pregnant in the next few months, see your provider. Your provider may do some tests and talk about things you can do to have a healthy pregnancy and healthy baby.   You should schedule your first prenatal visit with your provider as soon as you think or know you are pregnant. Depending on your health and health history, your provider will probably schedule visits at least once a month for the first 6 months. During the 7th and 8th months you will see your provider every 2 weeks. During the last month you will probably see your provider once a week until you deliver your baby. If your pregnancy is high risk, your provider will probably want to see you more often. In some cases your provider may refer you to a medical specialist for more help with special needs, such as diabetes.   At each visit your healthcare provider will check to make sure that you and the baby are  healthy. Regular visits can help you and your provider prevent possible problems. They can also help your provider find and treat any problems early. In addition to meeting your medical needs, your provider advise you about caring for yourself. You will talk about how to have a healthy diet and get plenty of exercise and rest. Your provider can also help you deal with the emotional changes that can happen during pregnancy.   What will happen at the first prenatal visit?   At your first visit, your provider will ask about your personal medical history. He or she will also ask about the baby's father and your family health history. This information can help give your provider an idea of any problems you might have during your pregnancy. You will have a physical exam, including checks of your height, weight, and blood pressure and a pelvic exam. You will have a Pap test, urine tests, blood tests, and cultures of the cervix and vagina to check for infection.   Your provider will calculate your due date and the age of your baby. If your periods were regular before you got pregnant, and you are sure of the day when your last period started, your due date will be estimated to be 40 weeks from that day.   Your provider will talk to you about how to stay healthy during your pregnancy.   What will happen at other prenatal visits?   Your provider will check how you are doing and how the baby is developing. He or she will discuss how you are feeling, ask if you have any problems, and answer your questions.   During each prenatal visit your provider will:   weigh you   take your blood pressure   check your urine for sugar, protein, or bacteria   check your face, hands, ankles, and feet for swelling   listen to the baby's heartbeat   measure the size of the uterus to check the baby's growth.   At different times during the pregnancy, other exams and tests may be done. Some are routine and others are done only when a problem is  suspected or you have a risk factor for a problem. Examples of other tests you might have are:   tests to check for genetic problems and some birth defects, such as:   chorionic villus sampling of cells from the placenta   amniocentesis to test the fluid around the baby   blood tests called triple or quad screens   ultrasound scans to check the baby's growth, development, and health and to look at your uterus, the amniotic sac, and the placenta   blood tests to check for diabetes   electronic monitoring to check the health of the baby.   How can I take care of myself during my pregnancy?   Here are some things you can do to take good care of yourself during your pregnancy and prepare for the birth of your child:   Keep all appointments with your healthcare provider. Use these visits to discuss your pregnancy concerns or problems. Write down questions before each visit so that you will not forget about things you want to talk about.   Eat healthy meals that include whole grains, fresh fruits and vegetables, and calcium-rich foods, such as milk, cheese, and yogurt. Choose foods low in saturated fat. Do not eat uncooked or undercooked meats or fish.   Avoid certain fish with high levels of mercury. These fish include shark, lisandra mackerel, swordfish, and tilefish. Do not eat more than 12 ounces of fish per week, or no more than 6 ounces of tuna fish per week. Because albacore tuna fish is also high in mercury, choose light tuna.   Drink plenty of water each day.   Take vitamins, other supplements, and medicines as recommended by your provider.   Unless your healthcare provider tells you not to, try to be physically active for at least 30 minutes a day, most days of the week. If you are pressed for time, you might find it easier to exercise 10 minutes at a time, 3 times a day. Consider taking a prenatal exercise class.   Do not smoke, do not drink alcohol, and do not take illegal drugs.   Talk to your healthcare provider  before you take any medicine, including nonprescription and herbal medicines. Some medicines are not safe during pregnancy.   Avoid hot tubs or saunas.   If you have cats in your home, do not empty the cat litter while you are pregnant. It may contain a parasite that causes an infection called toxoplasmosis, which can cause birth defects. Also, use gloves when you work in garden areas used by cats.   Stay away from toxic chemicals like insecticides, solvents (such as some  or paint thinners), lead, and mercury. Check labels on household products. Most dangerous products have pregnancy warnings on their labels. Ask your healthcare provider about products if you are unsure.   Relax by taking breaks from work or chores.   Help reduce stress by sharing your feelings with others.   Report any violence or other types of abuse in your home.   Learn more about pregnancy, labor, and delivery. Read books, watch videos, go to a childbirth class, and talk with experienced moms.   Plan for the lifestyle changes a new baby will bring. Prepare for possible changes in your budget, work situation, daily schedule, and relationships with family and friends.   Talk to your provider about the pros and cons of breast-feeding.   Before and during your pregnancy, try to do everything you can to keep yourself and your baby healthy during your pregnancy.     Published by [employer].  This content is reviewed periodically and is subject to change as new health information becomes available. The information is intended to inform and educate and is not a replacement for medical evaluation, advice, diagnosis or treatment by a healthcare professional.   Developed by [employer].   ? 2010 [facility] and/or its affiliates. All Rights Reserved.   Copyright   Clinical Reference Systems 2011              Follow-ups after your visit        Follow-up notes from your care team     Return in about 4 weeks (around 10/15/2018).      Who to contact      If you have questions or need follow up information about today's clinic visit or your schedule please contact [facility] directly at [phone].  Normal or non-critical lab and imaging results will be communicated to you by MyChart, letter or phone within 4 business days after the clinic has received the results. If you do not hear from us within 7 days, please contact the clinic through MyChart or phone. If you have a critical or abnormal lab result, we will notify you by phone as soon as possible.  Submit refill requests through [facility] or call your pharmacy and they will forward the refill request to us. Please allow 3 business days for your refill to be completed.          Additional Information About Your Visit        "[employer] Information     [facility] gives you secure access to your electronic health record. If you see a primary care provider, you can also send messages to your care team and make appointments. If you have questions, please call your primary care clinic.  If you do not have a primary care provider, please call [phone] and they will assist you.        Care EveryWhere ID     This is your Care EveryWhere ID. This could be used by other organizations to access your [place] medical records  [id number]        Your Vitals Were     Pulse Last Period BMI (Body Mass Index)             96 05/07/2018 (Exact Date) 30.82 kg/m2          Blood Pressure from Last 3 Encounters:   09/17/18 110/60   08/17/18 108/76   07/17/18 124/76    Weight from Last 3 Encounters:   09/17/18 213 lb 4 oz (96.7 kg)   08/17/18 215 lb 6.4 oz (97.7 kg)   07/17/18 208 lb 8 oz (94.6 kg)              Today, you had the following     No orders found for display       Primary Care Provider Fax #    Physician No Ref-Primary [phone]       No address on file        Equal Access to Services     MARTÍN PERES : Radha Mcgrath, roque rivera, harvinder young, augustine iglesias  min walker ah. So [facility] [phone].    ATENCIÓN: Si habla henri, tiene a clayton disposición servicios gratuitos de asistencia lingüística. Jeremiah lópez [phone].    We comply with applicable federal civil rights laws and [place] laws. We do not discriminate on the basis of race, color, national origin, age, disability, sex, sexual orientation, or gender identity.            Thank you!     Thank you for choosing [facility]  for your care. Our goal is always to provide you with excellent care. Hearing back from our patients is one way we can continue to improve our services. Please take a few minutes to complete the written survey that you may receive in the mail after your visit with us. Thank you!             Your Updated Medication List - Protect others around you: Learn how to safely use, store and throw away your medicines at www.disposemymeds.org.          This list is accurate as of 9/17/18  9:11 AM.  Always use your most recent med list.                   Brand Name Dispense Instructions for use Diagnosis    doxylamine 25 MG Tabs tablet    UNISOM     Take 25 mg by mouth At Bedtime        IBUPROFEN PO      Take 600 mg by mouth        metoclopramide 5 MG tablet    REGLAN    240 tablet    Take 1 tablet (5 mg) by mouth 4 times daily (before meals and nightly)    Nausea and vomiting in pregnancy       * ondansetron 8 MG tablet    ZOFRAN          * ondansetron 8 MG tablet    ZOFRAN    30 tablet    Take 1 tablet (8 mg) by mouth every 8 hours as needed for nausea    Nausea and vomiting in pregnancy       PRENATAL VITAMIN PO           SUMAtriptan 50 MG tablet    IMITREX     TK ONE T PO UTD.  MAY REPEAT AFTER 2 H.  MG / 24 H.        TUMS PO           TYLENOL 325 MG tablet   Generic drug:  acetaminophen      Take 325-650 mg by mouth every 6 hours as needed        valACYclovir 500 MG tablet    VALTREX    90 tablet    Take 1 tablet (500 mg) by mouth daily    Recurrent cold sores       VITAMIN B  12 PO      Take 1,000 mcg by mouth        VITAMIN B6 PO           ZANTAC PO           * Notice:  This list has 2 medication(s) that are the same as other medications prescribed for you. Read the directions carefully, and ask your doctor or other care provider to review them with you.

## 2018-09-17 NOTE — PROGRESS NOTES
33 year old  at 19w0d weeks presents to the clinic for a routine prenatal visit.  Feeling well.  No vaginal bleeding, leakage of fluid, or contractions   Fundal height=s=d  OMFu=490's  Discussed quad screen and she declines.   Level II ultrasound tomorrow   RTC 4 weeks.    Swathi Crisostomo

## 2018-09-17 NOTE — PATIENT INSTRUCTIONS
Prenatal Care  What is prenatal care?   Prenatal care is the care you receive when you are pregnant. It includes care given by your healthcare provider, support from your family, and an extra focus on giving yourself the care you need during this special time. Prenatal care improves your chances for a healthy pregnancy and healthy baby.   When should I see my healthcare provider?   Good care during pregnancy includes regularly scheduled prenatal exams.   If you are not yet pregnant but planning to get pregnant in the next few months, see your provider. Your provider may do some tests and talk about things you can do to have a healthy pregnancy and healthy baby.   You should schedule your first prenatal visit with your provider as soon as you think or know you are pregnant. Depending on your health and health history, your provider will probably schedule visits at least once a month for the first 6 months. During the 7th and 8th months you will see your provider every 2 weeks. During the last month you will probably see your provider once a week until you deliver your baby. If your pregnancy is high risk, your provider will probably want to see you more often. In some cases your provider may refer you to a medical specialist for more help with special needs, such as diabetes.   At each visit your healthcare provider will check to make sure that you and the baby are healthy. Regular visits can help you and your provider prevent possible problems. They can also help your provider find and treat any problems early. In addition to meeting your medical needs, your provider advise you about caring for yourself. You will talk about how to have a healthy diet and get plenty of exercise and rest. Your provider can also help you deal with the emotional changes that can happen during pregnancy.   What will happen at the first prenatal visit?   At your first visit, your provider will ask about your personal medical history. He  or she will also ask about the baby's father and your family health history. This information can help give your provider an idea of any problems you might have during your pregnancy. You will have a physical exam, including checks of your height, weight, and blood pressure and a pelvic exam. You will have a Pap test, urine tests, blood tests, and cultures of the cervix and vagina to check for infection.   Your provider will calculate your due date and the age of your baby. If your periods were regular before you got pregnant, and you are sure of the day when your last period started, your due date will be estimated to be 40 weeks from that day.   Your provider will talk to you about how to stay healthy during your pregnancy.   What will happen at other prenatal visits?   Your provider will check how you are doing and how the baby is developing. He or she will discuss how you are feeling, ask if you have any problems, and answer your questions.   During each prenatal visit your provider will:   weigh you   take your blood pressure   check your urine for sugar, protein, or bacteria   check your face, hands, ankles, and feet for swelling   listen to the baby's heartbeat   measure the size of the uterus to check the baby's growth.   At different times during the pregnancy, other exams and tests may be done. Some are routine and others are done only when a problem is suspected or you have a risk factor for a problem. Examples of other tests you might have are:   tests to check for genetic problems and some birth defects, such as:   chorionic villus sampling of cells from the placenta   amniocentesis to test the fluid around the baby   blood tests called triple or quad screens   ultrasound scans to check the baby's growth, development, and health and to look at your uterus, the amniotic sac, and the placenta   blood tests to check for diabetes   electronic monitoring to check the health of the baby.   How can I take care  of myself during my pregnancy?   Here are some things you can do to take good care of yourself during your pregnancy and prepare for the birth of your child:   Keep all appointments with your healthcare provider. Use these visits to discuss your pregnancy concerns or problems. Write down questions before each visit so that you will not forget about things you want to talk about.   Eat healthy meals that include whole grains, fresh fruits and vegetables, and calcium-rich foods, such as milk, cheese, and yogurt. Choose foods low in saturated fat. Do not eat uncooked or undercooked meats or fish.   Avoid certain fish with high levels of mercury. These fish include shark, lisandra mackerel, swordfish, and tilefish. Do not eat more than 12 ounces of fish per week, or no more than 6 ounces of tuna fish per week. Because albacore tuna fish is also high in mercury, choose light tuna.   Drink plenty of water each day.   Take vitamins, other supplements, and medicines as recommended by your provider.   Unless your healthcare provider tells you not to, try to be physically active for at least 30 minutes a day, most days of the week. If you are pressed for time, you might find it easier to exercise 10 minutes at a time, 3 times a day. Consider taking a prenatal exercise class.   Do not smoke, do not drink alcohol, and do not take illegal drugs.   Talk to your healthcare provider before you take any medicine, including nonprescription and herbal medicines. Some medicines are not safe during pregnancy.   Avoid hot tubs or saunas.   If you have cats in your home, do not empty the cat litter while you are pregnant. It may contain a parasite that causes an infection called toxoplasmosis, which can cause birth defects. Also, use gloves when you work in garden areas used by cats.   Stay away from toxic chemicals like insecticides, solvents (such as some  or paint thinners), lead, and mercury. Check labels on household products.  Most dangerous products have pregnancy warnings on their labels. Ask your healthcare provider about products if you are unsure.   Relax by taking breaks from work or chores.   Help reduce stress by sharing your feelings with others.   Report any violence or other types of abuse in your home.   Learn more about pregnancy, labor, and delivery. Read books, watch videos, go to a childbirth class, and talk with experienced moms.   Plan for the lifestyle changes a new baby will bring. Prepare for possible changes in your budget, work situation, daily schedule, and relationships with family and friends.   Talk to your provider about the pros and cons of breast-feeding.   Before and during your pregnancy, try to do everything you can to keep yourself and your baby healthy during your pregnancy.     Published by [employer]  This content is reviewed periodically and is subject to change as new health information becomes available. The information is intended to inform and educate and is not a replacement for medical evaluation, advice, diagnosis or treatment by a healthcare professional.   Developed by [employer]   ? 2010 [employer] and/or its affiliates. All Rights Reserved.   Copyright   Clinical Reference Systems 2011

## 2018-09-18 ENCOUNTER — TRANSFERRED RECORDS (OUTPATIENT)
Dept: HEALTH INFORMATION MANAGEMENT | Facility: CLINIC | Age: 33
End: 2018-09-18

## 2018-10-05 ENCOUNTER — TELEPHONE (OUTPATIENT)
Dept: OBGYN | Facility: OTHER | Age: 33
End: 2018-10-05

## 2018-10-05 NOTE — TELEPHONE ENCOUNTER
"Received phone call from pt.  Pt is a hospital nurse, calling from work.  Pt reports felt tachy and blurred vision in one eye briefly. Pt ate and drank, feels \"better\" Pt reports hx: migraines. Also reports not able to drink much water throughout the day.  Reports /90 and reports baby moving normally.  Reminded pt needs to take breaks and to be mindful of fluid intake and symptoms and to continue drinking water.  Recommended if pt has another episode, she would need to go home to rest and to call back. Pt agreed with plan of care. .Emmanuel Harrington RN on 10/5/2018 at 12:14 PM      "

## 2018-10-09 ENCOUNTER — TRANSFERRED RECORDS (OUTPATIENT)
Dept: HEALTH INFORMATION MANAGEMENT | Facility: CLINIC | Age: 33
End: 2018-10-09

## 2018-10-15 ENCOUNTER — PRENATAL OFFICE VISIT (OUTPATIENT)
Dept: OBGYN | Facility: OTHER | Age: 33
End: 2018-10-15
Payer: COMMERCIAL

## 2018-10-15 VITALS
WEIGHT: 226.5 LBS | BODY MASS INDEX: 32.73 KG/M2 | DIASTOLIC BLOOD PRESSURE: 72 MMHG | SYSTOLIC BLOOD PRESSURE: 110 MMHG | HEART RATE: 80 BPM

## 2018-10-15 DIAGNOSIS — K21.9 GASTROESOPHAGEAL REFLUX DISEASE WITHOUT ESOPHAGITIS: ICD-10-CM

## 2018-10-15 DIAGNOSIS — Z87.59 HISTORY OF GESTATIONAL HYPERTENSION: ICD-10-CM

## 2018-10-15 DIAGNOSIS — O09.522 ELDERLY MULTIGRAVIDA IN SECOND TRIMESTER: Primary | ICD-10-CM

## 2018-10-15 PROCEDURE — 99207 ZZC PRENATAL VISIT: CPT | Performed by: OBSTETRICS & GYNECOLOGY

## 2018-10-15 NOTE — MR AVS SNAPSHOT
After Visit Summary   10/15/2018    Julianne Jenkins    MRN: [id number]           Patient Information     Date Of Birth          1985        Visit Information        Provider Department      10/15/2018 4:30 PM Swathi Crisostomo,  [facility]        Today's Diagnoses     Elderly multigravida in second trimester    -  1    History of gestational hypertension        Gastroesophageal reflux disease without esophagitis          Care Instructions    What to watch out for are: regular contractions every 5 min, vaginal bleeding, decreased fetal movement, or leakage of fluid.  Please call the office or go to L&D if you develop any of these signs and symptoms.      I will see you for your follow up appointment.  Please feel free to call if you have any questions or concerns.      Thanks,  Swathi Crisostomo, DO            Follow-ups after your visit        Follow-up notes from your care team     Return in about 4 weeks (around 11/12/2018).      Future tests that were ordered for you today     Open Future Orders        Priority Expected Expires Ordered    Glucose tolerance gest screen 1 hour Routine  11/30/2018 10/15/2018    OB hemoglobin Routine  11/30/2018 10/15/2018            Who to contact     If you have questions or need follow up information about today's clinic visit or your schedule please contact [facility] directly at [phone].  Normal or non-critical lab and imaging results will be communicated to you by MyChart, letter or phone within 4 business days after the clinic has received the results. If you do not hear from us within 7 days, please contact the clinic through [employer] or phone. If you have a critical or abnormal lab result, we will notify you by phone as soon as possible.  Submit refill requests through [employer] or call your pharmacy and they will forward the refill request to us. Please allow 3 business days for your refill to be completed.           Additional Information About Your Visit        [employer] Information     [employer] gives you secure access to your electronic health record. If you see a primary care provider, you can also send messages to your care team and make appointments. If you have questions, please call your primary care clinic.  If you do not have a primary care provider, please call [phone] and they will assist you.        Care EveryWhere ID     This is your Care EveryWhere ID. This could be used by other organizations to access your [facility] medical records  [id number]        Your Vitals Were     Pulse Last Period BMI (Body Mass Index)             80 05/07/2018 (Exact Date) 32.73 kg/m2          Blood Pressure from Last 3 Encounters:   10/15/18 110/72   09/17/18 110/60   08/17/18 108/76    Weight from Last 3 Encounters:   10/15/18 226 lb 8 oz (102.7 kg)   09/17/18 213 lb 4 oz (96.7 kg)   08/17/18 215 lb 6.4 oz (97.7 kg)               Primary Care Provider Fax #    Physician No Ref-Primary [phone]       No address on file        Equal Access to Services     [facility] : Hadii aad ku hadasho Somark, waaxda luqadaha, qaybta kaalmada hannah, augustine puente. So [facility] [phone].    ATENCIÓN: Si habla español, tiene a clayton disposición servicios gratuitos de asistencia lingüística. Jeremiah al [phone].    We comply with applicable federal civil rights laws and [place] laws. We do not discriminate on the basis of race, color, national origin, age, disability, sex, sexual orientation, or gender identity.            Thank you!     Thank you for choosing [facility]  for your care. Our goal is always to provide you with excellent care. Hearing back from our patients is one way we can continue to improve our services. Please take a few minutes to complete the written survey that you may receive in the mail after your visit with us. Thank you!             Your Updated Medication List -  Protect others around you: Learn how to safely use, store and throw away your medicines at www.disposemymeds.org.          This list is accurate as of 10/15/18  5:09 PM.  Always use your most recent med list.                   Brand Name Dispense Instructions for use Diagnosis    doxylamine 25 MG Tabs tablet    UNISOM     Take 25 mg by mouth At Bedtime        IBUPROFEN PO      Take 600 mg by mouth        metoclopramide 5 MG tablet    REGLAN    240 tablet    Take 1 tablet (5 mg) by mouth 4 times daily (before meals and nightly)    Nausea and vomiting in pregnancy       * ondansetron 8 MG tablet    ZOFRAN          * ondansetron 8 MG tablet    ZOFRAN    30 tablet    Take 1 tablet (8 mg) by mouth every 8 hours as needed for nausea    Nausea and vomiting in pregnancy       PRENATAL VITAMIN PO           SUMAtriptan 50 MG tablet    IMITREX     TK ONE T PO UTD.  MAY REPEAT AFTER 2 H.  MG / 24 H.        TUMS PO           TYLENOL 325 MG tablet   Generic drug:  acetaminophen      Take 325-650 mg by mouth every 6 hours as needed        valACYclovir 500 MG tablet    VALTREX    90 tablet    Take 1 tablet (500 mg) by mouth daily    Recurrent cold sores       VITAMIN B 12 PO      Take 1,000 mcg by mouth        VITAMIN B6 PO           ZANTAC PO           * Notice:  This list has 2 medication(s) that are the same as other medications prescribed for you. Read the directions carefully, and ask your doctor or other care provider to review them with you.

## 2018-10-15 NOTE — PROGRESS NOTES
33 year old [id number] at 23w0d weeks presents to the clinic for a routine prenatal visit.  Patient still complains of nausea.   She works 12 hour shifts and feels that the nausea is interferring with it.  She is taking Zofran however has bad constipation with it so is taking it only occasionally.  We discussed other treatment options.  Note for 8 hour shifts given.  No leakage of fluid, vaginal bleeding, or contractions   Good fetal movement.  FHTs: 140's  Fundal height:  23cm  Normal repeat anatomy ultrasound   GERD=stable  RTC 4 weeks    Swathi Crisostomo

## 2018-10-15 NOTE — LETTER
[facility]  [address] [facility] [address]  Phone: [phone]    October 15, 2018        Julianne Jenkins  [id number] [place] [employer] [address]          To whom it may concern:    RE: Julianne Jenkins    Patient was seen and treated today at our clinic.  Please allow patient to only work 8 hour shifts at this time.      Please contact me for questions or concerns.      Sincerely,        Swathi Crisostomo, DO

## 2018-10-15 NOTE — PATIENT INSTRUCTIONS
What to watch out for are: regular contractions every 5 min, vaginal bleeding, decreased fetal movement, or leakage of fluid.  Please call the office or go to L&D if you develop any of these signs and symptoms.      I will see you for your follow up appointment.  Please feel free to call if you have any questions or concerns.      Thanks,  Swathi Crisostomo, DO

## 2018-10-17 ENCOUNTER — TELEPHONE (OUTPATIENT)
Dept: OBGYN | Facility: OTHER | Age: 33
End: 2018-10-17

## 2018-10-17 NOTE — LETTER
[facility]  [address] [facility] [address]  Phone: [phone]    10/17/18    Julianne Jenkins  [id number] [place] [facility] [address]      To whom it may concern:     RE: Julianne Jenkins    Patient was seen and treated today at our clinic.  Please allow patient to only work 8 hour shifts at this time due to fatigue and nausea during pregnancy.    Please contact me for questions or concerns.          Sincerely,      Swathi Crisostomo, DO

## 2018-10-17 NOTE — TELEPHONE ENCOUNTER
Per Dr. Dotty carvajal to update letter.  Letter updated and faxed.  Left VM for patient with this information.

## 2018-10-17 NOTE — TELEPHONE ENCOUNTER
Forwarded to provider to advise- patient was given letter on 10/15 by Dr. Crisostomo to work 8 hours shifts.  Okay to update letter?

## 2018-10-17 NOTE — TELEPHONE ENCOUNTER
Pt calling to request a new work note.  Her employer is not accepting the current one.  She states it needs to states that due to pregnancy related fatigue and illness she can only work 8 hr shifts until the end of her pregnancy.  Please call pt when ready and she will ppick up.

## 2018-10-17 NOTE — TELEPHONE ENCOUNTER
"Reason for Call:  Other call back    Detailed comments: Patient called clinic back wanting to add fax number of [phone]. She also stated she wants portion taken out that says \"and illness\" it should read : fatigue and nausea. Please call patient when note is ready/has been faxed so she is aware.    Phone Number Patient can be reached at: Home number on file [phone] (home)    Best Time: any    Can we leave a detailed message on this number? YES    Call taken on 10/17/2018 at 9:28 AM by Obie Gilbert      "

## 2018-10-19 ENCOUNTER — MYC MEDICAL ADVICE (OUTPATIENT)
Dept: OBGYN | Facility: OTHER | Age: 33
End: 2018-10-19

## 2018-10-19 NOTE — TELEPHONE ENCOUNTER
Called patient she will be dropping off her paperwork on Monday in [place].  Maryam Badillo [facility]

## 2018-10-19 NOTE — TELEPHONE ENCOUNTER
Reviewed last office visit notes per Dr. Crisostomo on 10-15-18 recommending her shifts be shortened to 8 hours. Noted letter from Dr. Crisostomo on 10-17-18.   Doris Gonsales RN, BAN

## 2018-10-22 ENCOUNTER — TELEPHONE (OUTPATIENT)
Dept: OBGYN | Facility: OTHER | Age: 33
End: 2018-10-22

## 2018-10-22 NOTE — TELEPHONE ENCOUNTER
Our goal is to have forms completed with 72 hours, however some forms may require a visit or additional information.    Who is the form from?: [facility] (if other please explain)  Where the form came from: Patient or family brought in     What clinic location was the form placed at?: [place]  Where the form was placed: 's Box  What number is listed as a contact on the form?:     Phone call message- patient request for a letter, form or note:    Date needed: as soon as possible  Please fax to [phone]  Has the patient signed a consent form for release of information? NO    Additional comments:     Call taken on 10/22/2018 at 11:55 AM by Faye Larsen    Type of letter, form or note: medical

## 2018-11-12 ENCOUNTER — PRENATAL OFFICE VISIT (OUTPATIENT)
Dept: OBGYN | Facility: OTHER | Age: 33
End: 2018-11-12
Payer: COMMERCIAL

## 2018-11-12 VITALS
SYSTOLIC BLOOD PRESSURE: 100 MMHG | HEART RATE: 100 BPM | DIASTOLIC BLOOD PRESSURE: 70 MMHG | WEIGHT: 219.25 LBS | BODY MASS INDEX: 31.69 KG/M2

## 2018-11-12 DIAGNOSIS — O09.522 ELDERLY MULTIGRAVIDA IN SECOND TRIMESTER: ICD-10-CM

## 2018-11-12 DIAGNOSIS — Z87.59 HISTORY OF GESTATIONAL HYPERTENSION: ICD-10-CM

## 2018-11-12 DIAGNOSIS — K21.9 GASTROESOPHAGEAL REFLUX DISEASE WITHOUT ESOPHAGITIS: ICD-10-CM

## 2018-11-12 DIAGNOSIS — O21.9 NAUSEA/VOMITING IN PREGNANCY: Primary | ICD-10-CM

## 2018-11-12 LAB
ALBUMIN SERPL-MCNC: 3 G/DL (ref 3.4–5)
ALBUMIN UR-MCNC: ABNORMAL MG/DL
ALP SERPL-CCNC: 63 U/L (ref 40–150)
ALT SERPL W P-5'-P-CCNC: 48 U/L (ref 0–50)
ANION GAP SERPL CALCULATED.3IONS-SCNC: 7 MMOL/L (ref 3–14)
APPEARANCE UR: CLEAR
AST SERPL W P-5'-P-CCNC: 22 U/L (ref 0–45)
BACTERIA #/AREA URNS HPF: ABNORMAL /HPF
BILIRUB SERPL-MCNC: 0.3 MG/DL (ref 0.2–1.3)
BILIRUB UR QL STRIP: NEGATIVE
BUN SERPL-MCNC: 7 MG/DL (ref 7–30)
CALCIUM SERPL-MCNC: 9 MG/DL (ref 8.5–10.1)
CHLORIDE SERPL-SCNC: 106 MMOL/L (ref 94–109)
CO2 SERPL-SCNC: 26 MMOL/L (ref 20–32)
COLOR UR AUTO: YELLOW
CREAT SERPL-MCNC: 0.65 MG/DL (ref 0.52–1.04)
ERYTHROCYTE [DISTWIDTH] IN BLOOD BY AUTOMATED COUNT: 13.2 % (ref 10–15)
GFR SERPL CREATININE-BSD FRML MDRD: >90 ML/MIN/1.7M2
GLUCOSE 1H P 50 G GLC PO SERPL-MCNC: 136 MG/DL (ref 60–129)
GLUCOSE SERPL-MCNC: 136 MG/DL (ref 70–99)
GLUCOSE UR STRIP-MCNC: >=1000 MG/DL
HCT VFR BLD AUTO: 35.9 % (ref 35–47)
HGB BLD-MCNC: 12.1 G/DL (ref 11.7–15.7)
HGB UR QL STRIP: NEGATIVE
KETONES UR STRIP-MCNC: ABNORMAL MG/DL
LEUKOCYTE ESTERASE UR QL STRIP: NEGATIVE
MCH RBC QN AUTO: 30.9 PG (ref 26.5–33)
MCHC RBC AUTO-ENTMCNC: 33.7 G/DL (ref 31.5–36.5)
MCV RBC AUTO: 92 FL (ref 78–100)
NITRATE UR QL: NEGATIVE
NON-SQ EPI CELLS #/AREA URNS LPF: ABNORMAL /LPF
PH UR STRIP: 7 PH (ref 5–7)
PLATELET # BLD AUTO: 283 10E9/L (ref 150–450)
POTASSIUM SERPL-SCNC: 3.4 MMOL/L (ref 3.4–5.3)
PROT SERPL-MCNC: 6.7 G/DL (ref 6.8–8.8)
RBC # BLD AUTO: 3.92 10E12/L (ref 3.8–5.2)
RBC #/AREA URNS AUTO: ABNORMAL /HPF
SODIUM SERPL-SCNC: 139 MMOL/L (ref 133–144)
SOURCE: ABNORMAL
SP GR UR STRIP: 1.02 (ref 1–1.03)
UROBILINOGEN UR STRIP-ACNC: 0.2 EU/DL (ref 0.2–1)
WBC # BLD AUTO: 11.7 10E9/L (ref 4–11)
WBC #/AREA URNS AUTO: ABNORMAL /HPF

## 2018-11-12 PROCEDURE — 81001 URINALYSIS AUTO W/SCOPE: CPT | Performed by: OBSTETRICS & GYNECOLOGY

## 2018-11-12 PROCEDURE — 85027 COMPLETE CBC AUTOMATED: CPT | Performed by: OBSTETRICS & GYNECOLOGY

## 2018-11-12 PROCEDURE — 82950 GLUCOSE TEST: CPT | Performed by: OBSTETRICS & GYNECOLOGY

## 2018-11-12 PROCEDURE — 99207 ZZC PRENATAL VISIT: CPT | Performed by: OBSTETRICS & GYNECOLOGY

## 2018-11-12 PROCEDURE — 80053 COMPREHEN METABOLIC PANEL: CPT | Performed by: OBSTETRICS & GYNECOLOGY

## 2018-11-12 PROCEDURE — 36415 COLL VENOUS BLD VENIPUNCTURE: CPT | Performed by: OBSTETRICS & GYNECOLOGY

## 2018-11-12 NOTE — MR AVS SNAPSHOT
After Visit Summary   11/12/2018    Julianne Jenkins    MRN: [id number]           Patient Information     Date Of Birth          1985        Visit Information        Provider Department      11/12/2018 8:45 AM Swathi Crisostomo, DO [facility]        Today's Diagnoses     Nausea/vomiting in pregnancy    -  1    History of gestational hypertension        Elderly multigravida in second trimester          Care Instructions    What to watch out for are: regular contractions every 5 min, vaginal bleeding, decreased fetal movement, or leakage of fluid.  Please call the office or go to L&D if you develop any of these signs and symptoms.      I will see you for your follow up appointment.  Please feel free to call if you have any questions or concerns.      Thanks,  Swathi Crisostomo, DO            Follow-ups after your visit        Follow-up notes from your care team     Return in about 2 weeks (around 11/26/2018).      Who to contact     If you have questions or need follow up information about today's clinic visit or your schedule please contact [facility] directly at [phone].  Normal or non-critical lab and imaging results will be communicated to you by [employer], letter or phone within 4 business days after the clinic has received the results. If you do not hear from us within 7 days, please contact the clinic through [employer] or phone. If you have a critical or abnormal lab result, we will notify you by phone as soon as possible.  Submit refill requests through [employer] or call your pharmacy and they will forward the refill request to us. Please allow 3 business days for your refill to be completed.          Additional Information About Your Visit        [employer] Information     [employer] gives you secure access to your electronic health record. If you see a primary care provider, you can also send messages to your care team and make appointments. If you have questions,  please call your primary care clinic.  If you do not have a primary care provider, please call [phone] and they will assist you.        Care EveryWhere ID     This is your Care EveryWhere ID. This could be used by other organizations to access your [place] medical records  [id number]        Your Vitals Were     Pulse Last Period BMI (Body Mass Index)             100 05/07/2018 (Exact Date) 31.69 kg/m2          Blood Pressure from Last 3 Encounters:   11/12/18 100/70   10/15/18 110/72   09/17/18 110/60    Weight from Last 3 Encounters:   11/12/18 219 lb 4 oz (99.5 kg)   10/15/18 226 lb 8 oz (102.7 kg)   09/17/18 213 lb 4 oz (96.7 kg)              We Performed the Following     CBC with platelets     Comprehensive metabolic panel     Glucose tolerance gest screen 1 hour     UA with Microscopic reflex to Culture        Primary Care Provider Fax #    Physician No Ref-Primary [phone]       No address on file        Equal Access to Services     MARTÍN PERES : Hadii mickie Mcgrath, waaxda ludaniellaadaha, qaybta kaalmada adebrooke, augustine walker . So [facility] [phone].    ATENCIÓN: Si habla español, tiene a clayton disposición servicios gratuitos de asistencia lingüística. Llame al [phone].    We comply with applicable federal civil rights laws and [place] laws. We do not discriminate on the basis of race, color, national origin, age, disability, sex, sexual orientation, or gender identity.            Thank you!     Thank you for choosing [facility]  for your care. Our goal is always to provide you with excellent care. Hearing back from our patients is one way we can continue to improve our services. Please take a few minutes to complete the written survey that you may receive in the mail after your visit with us. Thank you!             Your Updated Medication List - Protect others around you: Learn how to safely use, store and throw away your medicines at  www.disposemymeds.org.          This list is accurate as of 11/12/18  9:26 AM.  Always use your most recent med list.                   Brand Name Dispense Instructions for use Diagnosis    doxylamine 25 MG Tabs tablet    UNISOM     Take 25 mg by mouth At Bedtime        IBUPROFEN PO      Take 600 mg by mouth        metoclopramide 5 MG tablet    REGLAN    240 tablet    Take 1 tablet (5 mg) by mouth 4 times daily (before meals and nightly)    Nausea and vomiting in pregnancy       * ondansetron 8 MG tablet    ZOFRAN          * ondansetron 8 MG tablet    ZOFRAN    30 tablet    Take 1 tablet (8 mg) by mouth every 8 hours as needed for nausea    Nausea and vomiting in pregnancy       PRENATAL VITAMIN PO           SUMAtriptan 50 MG tablet    IMITREX     TK ONE T PO UTD.  MAY REPEAT AFTER 2 H.  MG / 24 H.        TUMS PO           TYLENOL 325 MG tablet   Generic drug:  acetaminophen      Take 325-650 mg by mouth every 6 hours as needed        valACYclovir 500 MG tablet    VALTREX    90 tablet    Take 1 tablet (500 mg) by mouth daily    Recurrent cold sores       VITAMIN B 12 PO      Take 1,000 mcg by mouth        VITAMIN B6 PO           ZANTAC PO           * Notice:  This list has 2 medication(s) that are the same as other medications prescribed for you. Read the directions carefully, and ask your doctor or other care provider to review them with you.

## 2018-11-12 NOTE — PROGRESS NOTES
33 year old  at 27w0d weeks presents to the clinic for a routine prenatal visit.  Patient continues to complain of nausea and vomiting.  She is taking Unisom, Vit B6, and Zofran.  The reglan has not helped much.  Small frequent meals.  She has lost 7lbs since her last office visit  She did vomit all weekend  Her family has been sick with cold signs and symptoms.  Her daughter has thrown up a few times however she does vomit when she gets a fever  No vaginal bleeding, leakage of fluid, or contractions   Good fetal movement.  Fundal height=26cm  ETOb=538  GCT and hgb today.  GERD=getting worse.  She is taking Zantac and Tums  Rh AB+  RTC 2 weeks.    Swathi Crisostomo

## 2018-11-19 DIAGNOSIS — O09.522 ELDERLY MULTIGRAVIDA IN SECOND TRIMESTER: ICD-10-CM

## 2018-11-19 LAB
GLUCOSE 1H P 100 G GLC PO SERPL-MCNC: 146 MG/DL (ref 60–179)
GLUCOSE 2H P 100 G GLC PO SERPL-MCNC: 105 MG/DL (ref 60–154)
GLUCOSE 3H P 100 G GLC PO SERPL-MCNC: 83 MG/DL (ref 60–139)
GLUCOSE P FAST SERPL-MCNC: 89 MG/DL (ref 60–94)

## 2018-11-19 PROCEDURE — 36415 COLL VENOUS BLD VENIPUNCTURE: CPT | Performed by: OBSTETRICS & GYNECOLOGY

## 2018-11-19 PROCEDURE — 82952 GTT-ADDED SAMPLES: CPT | Performed by: OBSTETRICS & GYNECOLOGY

## 2018-11-19 PROCEDURE — 82951 GLUCOSE TOLERANCE TEST (GTT): CPT | Performed by: OBSTETRICS & GYNECOLOGY

## 2018-11-28 ENCOUNTER — OFFICE VISIT (OUTPATIENT)
Dept: FAMILY MEDICINE | Facility: OTHER | Age: 33
End: 2018-11-28
Payer: COMMERCIAL

## 2018-11-28 ENCOUNTER — MYC MEDICAL ADVICE (OUTPATIENT)
Dept: FAMILY MEDICINE | Facility: OTHER | Age: 33
End: 2018-11-28

## 2018-11-28 VITALS
OXYGEN SATURATION: 97 % | DIASTOLIC BLOOD PRESSURE: 88 MMHG | WEIGHT: 218 LBS | TEMPERATURE: 99 F | HEART RATE: 98 BPM | BODY MASS INDEX: 31.5 KG/M2 | SYSTOLIC BLOOD PRESSURE: 124 MMHG | RESPIRATION RATE: 20 BRPM

## 2018-11-28 DIAGNOSIS — M54.50 LOW BACK PAIN DURING PREGNANCY IN THIRD TRIMESTER: ICD-10-CM

## 2018-11-28 DIAGNOSIS — O26.893 LOW BACK PAIN DURING PREGNANCY IN THIRD TRIMESTER: ICD-10-CM

## 2018-11-28 DIAGNOSIS — O09.523 ELDERLY MULTIGRAVIDA IN THIRD TRIMESTER: Primary | ICD-10-CM

## 2018-11-28 DIAGNOSIS — J01.90 ACUTE SINUSITIS WITH SYMPTOMS > 10 DAYS: ICD-10-CM

## 2018-11-28 PROCEDURE — 99213 OFFICE O/P EST LOW 20 MIN: CPT | Performed by: FAMILY MEDICINE

## 2018-11-28 NOTE — LETTER
[facility]  [address]   [place] [address]  Phone: [phone]    November 28, 2018        Julianne Jenkins  [address] [place] [address]  [facility] [address]          To whom it may concern:    RE: Julianne Jenkins    Patient is pregnant with low back pain. She will need to return to work (when well with restrictions.  Patient may return to work with the following:  Light duty-unable to lift more than 5 pounds. Will need to take 20 min sit down break every 2 hours.   These restrictions are valid for 2 weeks, then plan for reevaluation.    Please contact me for questions or concerns.      Sincerely,        Tameka Cruz MD

## 2018-11-28 NOTE — PROGRESS NOTES
SUBJECTIVE:   Julianne Jenkins is a 33 year old pregnant female who presents to clinic today for the following health issues:    HPI    RESPIRATORY SYMPTOMS      Duration: 4 days    Description  facial pain/pressure, cough, fever, chills, headache and fatigue/malaise    Severity: severe    Accompanying signs and symptoms: Low back pain    History (predisposing factors):  none    Precipitating or alleviating factors: None    Therapies tried and outcome:  rest and fluids acetaminophen. Nothing is really helping    Breathing is very difficult because she can't breathe out of her nose and the baby is taking up a lot of space in her chest, it is difficult to catch her breath.     Back Pain       Duration: 1 day        Specific cause: turning/bending    Description:   Location of pain: low back bilateral  Character of pain: sharp, dull ache and gnawing  Pain radiation:radiates into the right buttocks and radiates into the left buttocks  New numbness or weakness in legs, not attributed to pain:  no     Intensity: Currently 7/10, severe    History:   Pain interferes with job: YES,   History of back problems: no prior back problems  Any previous MRI or X-rays: None  Sees a specialist for back pain:  No  Therapies tried without relief: acetaminophen (Tylenol)    Alleviating factors:   Improved by: NA      Precipitating factors:  Worsened by: Bending, Standing, Sitting, Walking and Coughing    Last night when she got out of the bath she started having lower back pain, she wasn't able to bend to reach down. The pain is in her buttocks. With her first pregnancy she had back pain as well, but it was helped with the belly band. She tried some IcyHot, but it didn't help with the pain, just felt weird. When she does reposition herself to relieve the pain her abdomen will harden like she is having a Nahun-Morton. She was unable to get any sleep last night because of the back pain and cold, and is feeling very miserable today.         Problem list and histories reviewed & adjusted, as indicated.  Additional history: as documented    Patient Active Problem List   Diagnosis     Normal pregnancy in first trimester     Injury of knee, leg, ankle and foot     S/P LEEP (status post loop electrosurgical excision procedure)- MYNOR 3     Gastroesophageal reflux disease without esophagitis     Need for Tdap vaccination     History of gestational hypertension     Elderly multigravida in second trimester     Past Surgical History:   Procedure Laterality Date     APPENDECTOMY       COSMETIC SURGERY  2010    [facility]      LEEP TX, CERVICAL  1/13/15    MYNOR 3 without extension to margins       Social History   Substance Use Topics     Smoking status: Former Smoker     Types: Cigarettes     Quit date: 6/17/2018     Smokeless tobacco: Never Used     Alcohol use No     Family History   Problem Relation Age of Onset     Depression Mother      Hypertension Father          Current Outpatient Prescriptions   Medication Sig Dispense Refill     acetaminophen (TYLENOL) 325 MG tablet Take 325-650 mg by mouth every 6 hours as needed       amoxicillin-clavulanate (AUGMENTIN) 875-125 MG tablet Take 1 tablet by mouth 2 times daily 20 tablet 0     Calcium Carbonate Antacid (TUMS PO)        Cyanocobalamin (VITAMIN B 12 PO) Take 1,000 mcg by mouth       doxylamine (UNISOM) 25 MG TABS tablet Take 25 mg by mouth At Bedtime       metoclopramide (REGLAN) 5 MG tablet Take 1 tablet (5 mg) by mouth 4 times daily (before meals and nightly) 240 tablet 0     omeprazole (PRILOSEC) 20 MG CR capsule Take 1 capsule (20 mg) by mouth daily 30 capsule 1     ondansetron (ZOFRAN) 8 MG tablet   0     Prenatal Vit-Fe Fumarate-FA (PRENATAL VITAMIN PO)        Pyridoxine HCl (VITAMIN B6 PO)        Ranitidine HCl (ZANTAC PO)        valACYclovir (VALTREX) 500 MG tablet Take 1 tablet (500 mg) by mouth daily 90 tablet 3     IBUPROFEN PO Take 600 mg by mouth       ondansetron (ZOFRAN) 8 MG tablet Take 1  tablet (8 mg) by mouth every 8 hours as needed for nausea (Patient not taking: Reported on 11/12/2018) 30 tablet 3     SUMAtriptan (IMITREX) 50 MG tablet TK ONE T PO UTD.  MAY REPEAT AFTER 2 H.  MG / 24 H.  0       ROS:  Constitutional, HEENT, cardiovascular, pulmonary, GI, , musculoskeletal, neuro, skin, endocrine and psych systems are negative, except as in HPI or otherwise noted.     This document serves as a record of the services and decisions personally performed and made by Tameka Cruz MD. It was created on her behalf by Sapphire Dash, a trained medical scribe. The creation of this document is based the provider's statements to the medical scribe.  Sapphire Dash, November 28, 2018 10:45 AM     OBJECTIVE:                                                    /88 (BP Location: Left arm, Patient Position: Chair, Cuff Size: Adult Regular)  Pulse 98  Temp 99  F (37.2  C) (Temporal)  Resp 20  Wt 98.9 kg (218 lb)  LMP 05/07/2018 (Exact Date)  SpO2 97%  Breastfeeding? No  BMI 31.5 kg/m2  Body mass index is 31.5 kg/(m^2).   GENERAL: healthy, alert, well nourished, well hydrated, no distress  HENT: Ears- TM's and ear canals normal, Nose- nasal congestion, sinus(s): frontal pain/pressure, nasal pain/pressure, rhinorhea and facial pain/pressure; Mouth- nasal drainage in posterior oropharynx  NECK: no adenopathy, no asymmetry, masses, or scars  RESP: lungs clear to auscultation - no rales, no rhonchi, no wheezes  CV: regular rates and rhythm, normal S1 S2, no S3 or S4 and no murmur, no click or rub  ABDOMEN: soft, no tenderness, gravid  MS: extremities- no gross deformities noted, no edema  SKIN: no suspicious lesions, no rashes to visible skin  PSYCH: Alert and oriented times 3; speech- coherent , normal rate and volume; able to articulate logical thoughts, able to abstract reason, no tangential thoughts, no hallucinations or delusions, affect- normal    BACK:       Lumber/Thoracic Spine Exam: Tender:  left  SI joint, right SI joint- left more swollen than right       Hip Exam: Left Hip- abduction decreased, painful; Right Hip- Full ROM       ASSESSMENT/PLAN:                                                        ICD-10-CM    1. Elderly multigravida in third trimester O09.523    2. Acute sinusitis with symptoms > 10 days J01.90 amoxicillin-clavulanate (AUGMENTIN) 875-125 MG tablet   3. Low back pain during pregnancy in third trimester O26.893 [facility]    M54.5      Acute URI: Will treat for sinus infection with Augmentin which is acceptable for use in pregnancy.     Lower Back Pain in Pregnancy: Lower back muscular weakness exacerbated by pregnancy is the likely cause. Will need to start on physical therapy and have work restrictions until she is able to catch up with strengthening. Work restrictions given include 5 pound lifting limit and sitting breaks. Follow up in 2 weeks to reevaluate work restrictions.     Patient Instructions   Start PT for the back pain.     Follow up in 2 weeks to reevaluate the work restrictions.       The information in this document, created by the medical scribe for me, accurately reflects the services I personally performed and the decisions made by me. I have reviewed and approved this document for accuracy.   MD Tameka Jose MD, MD  [facility]

## 2018-11-28 NOTE — Clinical Note
Patient saw me for URI and back pain in pregnancy and needed restrictions for work. I wrote these and asked her to f/u in 2 weeks and scheduled her with me. If you would prefer to take this over, you can let her know when she f/u for your prenatal. Tameka Cruz MD

## 2018-11-28 NOTE — LETTER
[facility]  [address]   [place] [address]  Phone: [phone]    November 28, 2018        Julianne Jenkins  [id number]  [place] [address]          To whom it may concern:    RE: Julianne Jenkins    Patient was seen and treated today at our clinic and missed work. She is ill and will miss work tonight as well as she missed earlier this week due to illness.    Please contact me for questions or concerns.      Sincerely,        Tameka Cruz MD

## 2018-11-28 NOTE — LETTER
[facility]  [address]   [place] [address]  Phone: [phone]    November 28, 2018        Julianne Jenkins  [id number]  [facility] [facility] [address]          To whom it may concern:    RE: Julianne Jenkins    Patient was seen and treated today at our clinic and missed work. She is ill and will miss work tonight.    Please contact me for questions or concerns.      Sincerely,        Tameka Cruz MD

## 2018-11-30 ENCOUNTER — PRENATAL OFFICE VISIT (OUTPATIENT)
Dept: OBGYN | Facility: CLINIC | Age: 33
End: 2018-11-30
Payer: COMMERCIAL

## 2018-11-30 ENCOUNTER — THERAPY VISIT (OUTPATIENT)
Dept: PHYSICAL THERAPY | Facility: CLINIC | Age: 33
End: 2018-11-30
Attending: FAMILY MEDICINE
Payer: COMMERCIAL

## 2018-11-30 VITALS
BODY MASS INDEX: 31.94 KG/M2 | WEIGHT: 221 LBS | HEART RATE: 86 BPM | DIASTOLIC BLOOD PRESSURE: 72 MMHG | SYSTOLIC BLOOD PRESSURE: 108 MMHG

## 2018-11-30 DIAGNOSIS — K21.9 GASTROESOPHAGEAL REFLUX DISEASE WITHOUT ESOPHAGITIS: ICD-10-CM

## 2018-11-30 DIAGNOSIS — O09.522 ELDERLY MULTIGRAVIDA IN SECOND TRIMESTER: ICD-10-CM

## 2018-11-30 DIAGNOSIS — O26.893 LOW BACK PAIN DURING PREGNANCY IN THIRD TRIMESTER: ICD-10-CM

## 2018-11-30 DIAGNOSIS — Z23 NEED FOR TDAP VACCINATION: ICD-10-CM

## 2018-11-30 DIAGNOSIS — O09.523 ELDERLY MULTIGRAVIDA IN THIRD TRIMESTER: Primary | ICD-10-CM

## 2018-11-30 DIAGNOSIS — M54.50 LOW BACK PAIN DURING PREGNANCY IN THIRD TRIMESTER: ICD-10-CM

## 2018-11-30 DIAGNOSIS — Z87.59 HISTORY OF GESTATIONAL HYPERTENSION: ICD-10-CM

## 2018-11-30 PROCEDURE — 90471 IMMUNIZATION ADMIN: CPT | Performed by: OBSTETRICS & GYNECOLOGY

## 2018-11-30 PROCEDURE — 97161 PT EVAL LOW COMPLEX 20 MIN: CPT | Mod: GP | Performed by: PHYSICAL THERAPIST

## 2018-11-30 PROCEDURE — 97140 MANUAL THERAPY 1/> REGIONS: CPT | Mod: GP | Performed by: PHYSICAL THERAPIST

## 2018-11-30 PROCEDURE — 97110 THERAPEUTIC EXERCISES: CPT | Mod: GP | Performed by: PHYSICAL THERAPIST

## 2018-11-30 PROCEDURE — 99207 ZZC PRENATAL VISIT: CPT | Performed by: OBSTETRICS & GYNECOLOGY

## 2018-11-30 PROCEDURE — 90715 TDAP VACCINE 7 YRS/> IM: CPT | Performed by: OBSTETRICS & GYNECOLOGY

## 2018-11-30 NOTE — MR AVS SNAPSHOT
After Visit Summary   11/30/2018    Julianne Jenkins    MRN: [id number]           Patient Information     Date Of Birth          1985        Visit Information        Provider Department      11/30/2018 9:40 AM Hilligoss, Amanda K, PT [facility] Physical Therapy        Today's Diagnoses     Low back pain during pregnancy in third trimester           Follow-ups after your visit        Your next 10 appointments already scheduled     Dec 05, 2018  9:00 AM CST   HER[facility] For Women Only with Amanda K Hilligoss, PT   [facility] ([facility] [place]  )    [address]  [place] [address] [phone]            Dec 10, 2018  4:30 PM CST   Office Visit with Tameka Cruz MD   [facility]    [address]  [place] [address] [phone]           Bring a current list of meds and any records pertaining to this visit. For Physicals, please bring immunization records and any forms needing to be filled out. Please arrive 10 minutes early to complete paperwork.              Who to contact     If you have questions or need follow up information about today's clinic visit or your schedule please contact [facility] THERAP[facility] directly at [phone].  Normal or non-critical lab and imaging results will be communicated to you by MyChart, letter or phone within 4 business days after the clinic has received the results. If you do not hear from us within 7 days, please contact the clinic through MyChart or phone. If you have a critical or abnormal lab result, we will notify you by phone as soon as possible.  Submit refill requests through [employer] or call your pharmacy and they will forward the refill request to us. Please allow 3 business days for your refill to be completed.          Additional Information About Your Visit         [email or web address] Information     [email or web address] gives you secure access to your electronic health record. If you see a primary care provider, you can also send messages to your care team and make appointments. If you have questions, please call your primary care clinic.  If you do not have a primary care provider, please call [phone] and they will assist you.        Care EveryWhere ID     This is your Care EveryWhere ID. This could be used by other organizations to access your [place] medical records  [id number]        Your Vitals Were     Last Period                   05/07/2018 (Exact Date)            Blood Pressure from Last 3 Encounters:   11/30/18 108/72   11/28/18 124/88   11/12/18 100/70    Weight from Last 3 Encounters:   11/30/18 100.2 kg (221 lb)   11/28/18 98.9 kg (218 lb)   11/12/18 99.5 kg (219 lb 4 oz)              We Performed the Following     HC PT EVAL, LOW COMPLEXITY     HERLINDA INITIAL EVAL REPORT     [facility] PT, HAND, AND CHIROPRACTIC REFERRAL     MANUAL THER TECH,1+REGIONS,EA 15 MIN     THERAPEUTIC EXERCISES        Primary Care Provider Fax #    Physician No Ref-Primary [phone]       No address on file        Equal Access to Services     SARI PERES : Hadii aad stu tomo Somark, waaxda luqadaha, qaybta kaalmada adeegyada, augustine walker . So [facility] [phone].    ATENCIÓN: Si habla español, tiene a clayton disposición servicios gratuitos de asistencia lingüística. Llame al [phone].    We comply with applicable federal civil rights laws and [place] laws. We do not discriminate on the basis of race, color, national origin, age, disability, sex, sexual orientation, or gender identity.            Thank you!     Thank you for choosing [facility]  for your care. Our goal is always to provide you with excellent care. Hearing back from our patients is one way we can continue to improve our services. Please take a few minutes to  complete the written survey that you may receive in the mail after your visit with us. Thank you!             Your Updated Medication List - Protect others around you: Learn how to safely use, store and throw away your medicines at www.disposemymeds.org.          This list is accurate as of 11/30/18 11:43 AM.  Always use your most recent med list.                   Brand Name Dispense Instructions for use Diagnosis    amoxicillin-clavulanate 875-125 MG tablet    AUGMENTIN    20 tablet    Take 1 tablet by mouth 2 times daily    Acute sinusitis with symptoms > 10 days       doxylamine 25 MG Tabs tablet    UNISOM     Take 25 mg by mouth At Bedtime        IBUPROFEN PO      Take 600 mg by mouth        metoclopramide 5 MG tablet    REGLAN    240 tablet    Take 1 tablet (5 mg) by mouth 4 times daily (before meals and nightly)    Nausea and vomiting in pregnancy       omeprazole 20 MG DR capsule    priLOSEC    30 capsule    Take 1 capsule (20 mg) by mouth daily    Gastroesophageal reflux disease without esophagitis       * ondansetron 8 MG tablet    ZOFRAN          * ondansetron 8 MG tablet    ZOFRAN    30 tablet    Take 1 tablet (8 mg) by mouth every 8 hours as needed for nausea    Nausea and vomiting in pregnancy       PRENATAL VITAMIN PO           SUMAtriptan 50 MG tablet    IMITREX     TK ONE T PO UTD.  MAY REPEAT AFTER 2 H.  MG / 24 H.        TUMS PO           TYLENOL 325 MG tablet   Generic drug:  acetaminophen      Take 325-650 mg by mouth every 6 hours as needed        valACYclovir 500 MG tablet    VALTREX    90 tablet    Take 1 tablet (500 mg) by mouth daily    Recurrent cold sores       VITAMIN B 12 PO      Take 1,000 mcg by mouth        VITAMIN B6 PO           ZANTAC PO           * Notice:  This list has 2 medication(s) that are the same as other medications prescribed for you. Read the directions carefully, and ask your doctor or other care provider to review them with you.

## 2018-11-30 NOTE — MR AVS SNAPSHOT
After Visit Summary   2018    Julianne Jenkins    MRN: [id number]           Patient Information     Date Of Birth          1985        Visit Information        Provider Department      2018 10:45 AM Swathi Crisostomo DO [facility]        Today's Diagnoses     Elderly multigravida in third trimester    -  1    History of gestational hypertension        Gastroesophageal reflux disease without esophagitis        Need for Tdap vaccination        Elderly multigravida in second trimester          Care Instructions    SIGNS OF  LABOR    Labor is  if it happens more than three weeks before your due date.    It can be hard to know if you are in labor, since the symptoms can be like the normal feelings of pregnancy.  Often, the only difference is the symptoms increase or they don't go away.     Signs of  labor can include:    Change in your vaginal discharge:  You will have more vaginal discharge when you are pregnant and it should be creamy white.  Call the clinic right away if your discharge has a foul odor, pink or bloody,  or if it becomes watery or is more than is normal for you during your pregnancy.    More than 5-6 contractions or tightenings per hour.  Contractions can feel like period cramps or bowel (gas or diarrhea) pain.  You will feel it in the lower part of your abdomen, in your back or as a pressure feeling in your bottom.  It is often regular, coming for 30 seconds or a minute and then going away, only to come back 5 or 10 minutes later. Some contractions are normal during pregnancy, but if you are feeling more than 5-6 in one hour, empty your bladder, then drink 16-24 ounces of water, eat a snack and lay down on your left side. Put your hand on your abdomen to count the contractions.  If after one hour of resting you have still had 5-6 contractions call your clinic.          Follow-ups after your visit        Follow-up notes from your care  team     Return in about 2 weeks (around 12/14/2018).      Your next 10 appointments already scheduled     Dec 05, 2018  9:00 AM CST   [facility] For Women Only with Amanda K Hilligoss, PT   [facility] [place] [facility] ([facility] [place]  )    [address]  [facility] [address]   [phone]            Dec 10, 2018  4:30 PM CST   Office Visit with Tameka Cruz MD   [facility] ([facility])    [address]  [facility] [address]   [phone]           Bring a current list of meds and any records pertaining to this visit. For Physicals, please bring immunization records and any forms needing to be filled out. Please arrive 10 minutes early to complete paperwork.              Who to contact     If you have questions or need follow up information about today's clinic visit or your schedule please contact [facility] directly at [phone].  Normal or non-critical lab and imaging results will be communicated to you by [employer], letter or phone within 4 business days after the clinic has received the results. If you do not hear from us within 7 days, please contact the clinic through [employer] or phone. If you have a critical or abnormal lab result, we will notify you by phone as soon as possible.  Submit refill requests through [employer] or call your pharmacy and they will forward the refill request to us. Please allow 3 business days for your refill to be completed.          Additional Information About Your Visit        [employer] Information     [employer] gives you secure access to your electronic health record. If you see a primary care provider, you can also send messages to your care team and make appointments. If you have questions, please call your primary care clinic.  If you do not have a primary care provider, please call [phone] and they will assist you.        Care EveryWhere ID     This is your Care EveryWhere  ID. This could be used by other organizations to access your [place] medical records  [id number]        Your Vitals Were     Pulse Last Period BMI (Body Mass Index)             86 05/07/2018 (Exact Date) 31.94 kg/m2          Blood Pressure from Last 3 Encounters:   11/30/18 108/72   11/28/18 124/88   11/12/18 100/70    Weight from Last 3 Encounters:   11/30/18 221 lb (100.2 kg)   11/28/18 218 lb (98.9 kg)   11/12/18 219 lb 4 oz (99.5 kg)              Today, you had the following     No orders found for display       Primary Care Provider Fax #    Physician No Ref-Primary [phone]       No address on file        Equal Access to Services     MARTÍN PERES : Radha Mcgrath, wachristi rivera, qaaleksandra kaalmamarcus young, augustine walker . So [facility] [phone].    ATENCIÓN: Si habla español, tiene a clayton disposición servicios gratuitos de asistencia lingüística. Llame al [phone].    We comply with applicable federal civil rights laws and [place] laws. We do not discriminate on the basis of race, color, national origin, age, disability, sex, sexual orientation, or gender identity.            Thank you!     Thank you for choosing [facility]  for your care. Our goal is always to provide you with excellent care. Hearing back from our patients is one way we can continue to improve our services. Please take a few minutes to complete the written survey that you may receive in the mail after your visit with us. Thank you!             Your Updated Medication List - Protect others around you: Learn how to safely use, store and throw away your medicines at www.disposemymeds.org.          This list is accurate as of 11/30/18 11:12 AM.  Always use your most recent med list.                   Brand Name Dispense Instructions for use Diagnosis    amoxicillin-clavulanate 875-125 MG tablet    AUGMENTIN    20 tablet    Take 1 tablet by mouth 2 times daily    Acute sinusitis with  symptoms > 10 days       doxylamine 25 MG Tabs tablet    UNISOM     Take 25 mg by mouth At Bedtime        IBUPROFEN PO      Take 600 mg by mouth        metoclopramide 5 MG tablet    REGLAN    240 tablet    Take 1 tablet (5 mg) by mouth 4 times daily (before meals and nightly)    Nausea and vomiting in pregnancy       omeprazole 20 MG DR capsule    priLOSEC    30 capsule    Take 1 capsule (20 mg) by mouth daily    Gastroesophageal reflux disease without esophagitis       * ondansetron 8 MG tablet    ZOFRAN          * ondansetron 8 MG tablet    ZOFRAN    30 tablet    Take 1 tablet (8 mg) by mouth every 8 hours as needed for nausea    Nausea and vomiting in pregnancy       PRENATAL VITAMIN PO           SUMAtriptan 50 MG tablet    IMITREX     TK ONE T PO UTD.  MAY REPEAT AFTER 2 H.  MG / 24 H.        TUMS PO           TYLENOL 325 MG tablet   Generic drug:  acetaminophen      Take 325-650 mg by mouth every 6 hours as needed        valACYclovir 500 MG tablet    VALTREX    90 tablet    Take 1 tablet (500 mg) by mouth daily    Recurrent cold sores       VITAMIN B 12 PO      Take 1,000 mcg by mouth        VITAMIN B6 PO           ZANTAC PO           * Notice:  This list has 2 medication(s) that are the same as other medications prescribed for you. Read the directions carefully, and ask your doctor or other care provider to review them with you.

## 2018-11-30 NOTE — PROGRESS NOTES
[facility] Initial Evaluation  SUBJECTIVE:    Julianne eJnkins is a 33 year old year old female with a low back condition  She is currently 29 weeks gestation with her 2nd pregnacy.  Patient reports onset of symptoms about 2 months ago (September 2018), progressing over past 3 days   Symptoms include low back pain, gluteal pain; denies further radiating pain, numbness, or tingling.  Pain:  4-8/10 depending on position  Quality: constant aching and intermittent sharp pain  Pain is worst first thing in the morning (getting out of bed, but can also be bad getting out of a chair later in the day)  Symptoms are exacerbated by transfers, bending, lifting, walking  Symptoms are relieved by rest, and some by stability belt; trialed Tylenol without improvement  Since onset symptoms have been getting worse (more intense)  Any complication with this pregnancy? No  Given birth? Yes   Any complications? Gestational hypertension  # of vaginal delieveries? 1  # of C-sections? 0  # of episiotomies? 0.  Are you sexually active? Yes  Have you ever been worried for your physical safety? Yes    General Health good; medical hx: smoking (quit May 2018)  Medical allergies: Vicodin  Current medications: prenatal vitamin, sleep medication  Current Occupation RN; job tasks include lifting, repetitive tasks, prolonged standing, computer work  Lives at home w/ , 5 yo daughter, and medium sized dog (currently a puppy)  Hobbies previously included going for walks    The history is provided by the patient. No  was used.                       Objective:  Standing Alignment:        Lumbar:  Lordosis incr and sway back                           Lumbar/SI Evaluation  ROM:    AROM Lumbar:   Flexion:            To knees ++pain B low back  Ext:                    Mod limitation ++tightness L>R   Side Bend:        Left:  To knee    Right:  To mid thigh ++pain/pull L  Rotation:           Left:  WNL    Right:   Mod limitation ++pain R  Side Glide:        Left:     Right:           Lumbar Myotomes:  Lumbar myotomes: WNL/ EQ B when tested in sitting.                    Lumbar Palpation:    Tenderness present at Left:    Quadratus Lumborum; Erector Spinae; Piriformis and Gluteus Medius  Tenderness present at Right: Quadratus Lumborum; Erector Spinae; Piriformis and Gluteus Medius      Spinal Segmental Conclusions:     Level: FRS left noted at L5      SI joint/Sacrum:    (+) Torsion R  (-) CASANDRA   (-) Active SLR                                            Hip Evaluation    Hip Strength:  : Abd tested in SL w/ hip in slight ext.    Flexion:   Left: 5/5   Pain:  Right: 5/5   Pain:                      Abduction:  Right: 4/5   +   Pain:                                 General     ROS    Assessment/Plan:    Patient is a 33 year old female with lumbar complaints.    Patient has the following significant findings with corresponding treatment plan.                Diagnosis 1:  LBP in pregnancy (third trimester)  Pain -  manual therapy, self management, education and home program  Decreased ROM/flexibility - manual therapy, therapeutic exercise, therapeutic activity and home program  Decreased joint mobility - manual therapy, therapeutic exercise, therapeutic activity and home program  Decreased strength - therapeutic exercise, therapeutic activities and home program  Impaired muscle performance - neuro re-education and home program  Decreased function - therapeutic activities and home program  Impaired posture - neuro re-education, therapeutic activities and home program    Therapy Evaluation Codes:   1) History comprised of:   Personal factors that impact the plan of care:      Gender, Past/current experiences, Profession and Time since onset of symptoms.    Comorbidity factors that impact the plan of care are:      Current pregnancy.     Medications impacting care: Sleep.  2) Examination of Body Systems comprised of:   Body  structures and functions that impact the plan of care:      Lumbar spine and Pelvis.   Activity limitations that impact the plan of care are:      Bathing, Bending, Cooking, Driving, Dressing, Lifting, Sitting, Squatting/kneeling, Stairs, Standing, Walking, Working, Sleeping and Laying down.  3) Clinical presentation characteristics are:   Stable/Uncomplicated.  4) Decision-Making    Low complexity using standardized patient assessment instrument and/or measureable assessment of functional outcome.  Cumulative Therapy Evaluation is: Low complexity.    Previous and current functional limitations:  (See Goal Flow Sheet for this information)    Short term and Long term goals: (See Goal Flow Sheet for this information)     Communication ability:  Patient appears to be able to clearly communicate and understand verbal and written communication and follow directions correctly.  Treatment Explanation - The following has been discussed with the patient:   RX ordered/plan of care  Anticipated outcomes  Possible risks and side effects  This patient would benefit from PT intervention to resume normal activities.   Rehab potential is good.    Frequency:  1 X week, once daily  Duration:  for 2-4 weeks  Discharge Plan:  Achieve all LTG.  Independent in home treatment program.  Reach maximal therapeutic benefit.    Please refer to the daily flowsheet for treatment today, total treatment time and time spent performing 1:1 timed codes.

## 2018-11-30 NOTE — PROGRESS NOTES
33 year old  at 29w4d weeks presents to the clinic for a routine prenatal visit.  Patient was seen by FP last week for a cold and lower back pain.  She has been going to Physical therapy.  Patient is taking Tylenol as well  Patient states the nausea is still there but is tolerable  GERD=taking Prilosec and is doing better  No vaginal bleeding, leakage of fluid, or contractions   Good fetal movement.  Fundal height=30cm  WJQr=825  GCT=failed one hour but passed three hour  Hgb=12.1  Rh AB+  RTC 2 weeks.  TDAP today      Swathi Crisostomo

## 2018-12-05 ENCOUNTER — THERAPY VISIT (OUTPATIENT)
Dept: PHYSICAL THERAPY | Facility: CLINIC | Age: 33
End: 2018-12-05
Payer: COMMERCIAL

## 2018-12-05 DIAGNOSIS — O26.893 LOW BACK PAIN DURING PREGNANCY IN THIRD TRIMESTER: ICD-10-CM

## 2018-12-05 DIAGNOSIS — M54.50 LOW BACK PAIN DURING PREGNANCY IN THIRD TRIMESTER: ICD-10-CM

## 2018-12-05 PROCEDURE — 97140 MANUAL THERAPY 1/> REGIONS: CPT | Mod: GP | Performed by: PHYSICAL THERAPIST

## 2018-12-05 PROCEDURE — 97110 THERAPEUTIC EXERCISES: CPT | Mod: GP | Performed by: PHYSICAL THERAPIST

## 2018-12-05 NOTE — PROGRESS NOTES
SUBJECTIVE:   Julianne Jenkins is a 33 year old female who presents to clinic today for the following health issues:    HPI  Back Pain       Duration: 2 weeks        Specific cause: Pregnancy     Description:   Location of pain: low back bilateral  Character of pain: dull ache  Pain radiation:none and lower abdomen and buttocks  New numbness or weakness in legs, not attributed to pain:  no     Intensity: Currently 3/10, moderate    History:   Pain interferes with job: YES, has had to shorten shifts and still struggles at the end of her shift  History of back problems: no prior back problems  Any previous MRI or X-rays: None  Sees a specialist for back pain:  No  Therapies tried without relief: acetaminophen (Tylenol)    Alleviating factors:   Improved by: acetaminophen (Tylenol) and heat      Precipitating factors:  Worsened by: NA    She continues to do back exercises which has helped. With increased rest, she requires more exercise to keep her back strong. The pain does not interfere with her regular daily routines at home, as long as she uses her movements learned through PT. She does have significant back fatigue towards the end of the days, but she is still struggling at work at the end of her 8 hour shift. When she is fatigued at the end of a shift is when she gets shooting back pain. She has been on the 8 hours shift scheduled for about 6 weeks.       Accompanying Signs & Symptoms:  Risk of Fracture:  None  Risk of Cauda Equina:  None  Risk of Infection:  None  Risk of Cancer:  None  Risk of Ankylosing Spondylitis:  Onset at age <35, male, AND morning back stiffness. no     Problem list and histories reviewed & adjusted, as indicated.  Additional history: as documented    Patient Active Problem List   Diagnosis     Normal pregnancy in first trimester     Injury of knee, leg, ankle and foot     S/P LEEP (status post loop electrosurgical excision procedure)- MYNOR 3     Gastroesophageal reflux disease without  "esophagitis     Need for Tdap vaccination     History of gestational hypertension     Elderly multigravida in second trimester     Low back pain during pregnancy in third trimester     Past Surgical History:   Procedure Laterality Date     APPENDECTOMY       COSMETIC SURGERY  2010    [facility]      LEEP TX, CERVICAL  1/13/15    MYNOR 3 without extension to margins       Social History     Tobacco Use     Smoking status: Former Smoker     Types: Cigarettes     Last attempt to quit: 2018     Years since quittin.4     Smokeless tobacco: Never Used   Substance Use Topics     Alcohol use: No     Family History   Problem Relation Age of Onset     Depression Mother      Hypertension Father            ROS:  Constitutional, HEENT, cardiovascular, pulmonary, GI, , musculoskeletal, neuro, skin, endocrine and psych systems are negative, except as in HPI or otherwise noted.     This document serves as a record of the services and decisions personally performed and made by Tameka Cruz MD. It was created on her behalf by Nehemias Vernon, a trained medical scribe. The creation of this document is based the provider's statements to the medical scribe.  Nehemias Vernon, December 10, 2018 5:08 PM    OBJECTIVE:                                                    /62 (BP Location: Right arm, Patient Position: Chair, Cuff Size: Adult Regular)   Pulse 106   Temp 98.8  F (37.1  C) (Temporal)   Resp 20   Ht 1.765 m (5' 9.5\")   Wt 103.7 kg (228 lb 9.6 oz)   LMP 2018 (Exact Date)   SpO2 97%   Breastfeeding? No   BMI 33.27 kg/m    Body mass index is 33.27 kg/m .   GENERAL: healthy, alert, well nourished, well hydrated, no distress, pregnant  SKIN: no suspicious lesions, no rashes to visible skin  PSYCH: Alert and oriented times 3; speech- coherent , normal rate and volume; able to articulate logical thoughts, able to abstract reason, no tangential thoughts, no hallucinations or delusions, affect- normal  Back Exam "     Range of Motion   Extension: abnormal   Flexion: normal   Lateral bend right: abnormal   Lateral bend left: abnormal     Comments:  Tightness and fatigue near SI joints bilaterally on bilateral tilt and extension        Diagnostic test results:  No results found for this or any previous visit (from the past 24 hour(s)).     ASSESSMENT/PLAN:                                                        ICD-10-CM    1. Low back pain during pregnancy in third trimester O26.893     M54.5      Back Pain:  Offered limiting her to 4 hour shifts, which she declines at this time. Recommended continuing PT exercises to strengthen core muscles. 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 Recommended she try 4 hour shifts for 2 weeks to reduce fatigue until she can become reconditioned to working longer hours. Return to clinic in 2 weeks to recheck restrictions.     Length of visit was 17 minutes with more than 50 percent of that time used for discussing medical concerns and education.    The information in this document, created by the medical scribe for me, accurately reflects the services I personally performed and the decisions made by me. I have reviewed and approved this document for accuracy.   MD Tameka Jose MD, MD  [facility]

## 2018-12-10 ENCOUNTER — OFFICE VISIT (OUTPATIENT)
Dept: FAMILY MEDICINE | Facility: OTHER | Age: 33
End: 2018-12-10
Payer: COMMERCIAL

## 2018-12-10 VITALS
OXYGEN SATURATION: 97 % | WEIGHT: 228.6 LBS | HEIGHT: 70 IN | SYSTOLIC BLOOD PRESSURE: 128 MMHG | BODY MASS INDEX: 32.73 KG/M2 | RESPIRATION RATE: 20 BRPM | TEMPERATURE: 98.8 F | DIASTOLIC BLOOD PRESSURE: 62 MMHG | HEART RATE: 106 BPM

## 2018-12-10 DIAGNOSIS — O26.893 LOW BACK PAIN DURING PREGNANCY IN THIRD TRIMESTER: Primary | ICD-10-CM

## 2018-12-10 DIAGNOSIS — M54.50 LOW BACK PAIN DURING PREGNANCY IN THIRD TRIMESTER: Primary | ICD-10-CM

## 2018-12-10 PROCEDURE — 99213 OFFICE O/P EST LOW 20 MIN: CPT | Performed by: FAMILY MEDICINE

## 2018-12-10 ASSESSMENT — MIFFLIN-ST. JEOR: SCORE: 1804.23

## 2018-12-10 NOTE — LETTER
[facility]  [address]   [place] [address]  Phone: [phone]    December 10, 2018        Julianne Jenkins  [id number]  [place] [address]          To whom it may concern:    RE: Julianne Jenkins    Patient may return to work with the following:  No working or lifting restrictions. Though will restart 4 hour shifts with full duty. 2 weeks    Please contact me for questions or concerns.      Sincerely,        Tameka Cruz MD

## 2018-12-14 NOTE — PROGRESS NOTES
SUBJECTIVE:   Julianne Jenkins is a 33 year old female who presents to clinic today for the following health issues:      HPI  Back Pain       Duration: 3 weeks        Specific cause: Pregnancy    Description:   Location of pain: low back bilateral  Character of pain: dull ache  Pain radiation:radiates into the right buttocks and radiates into the left buttocks  New numbness or weakness in legs, not attributed to pain:  YES    Intensity: Currently 2/10    History:   Pain interferes with job: YES,   History of back problems: no prior back problems  Any previous MRI or X-rays: None  Sees a specialist for back pain:  No  Therapies tried without relief:    Alleviating factors:   Improved by: acetaminophen (Tylenol) and heat      Precipitating factors:  Worsened by: Lifting, Bending and Standing    Accompanying Signs & Symptoms:  Risk of Fracture:  None  Risk of Cauda Equina:  None  Risk of Infection:  None  Risk of Cancer:  None  Risk of Ankylosing Spondylitis:  Onset at age <35, male, AND morning back stiffness. no     She continues to work, and is able to perform her regular duties, though she feels extremely worn down and exhausted after her shifts. She has been trying to do the 4 hour shifts, but she notes it isn't much better. She isn't being pressured at work to perform faster or do more, though she still feels she is slowing down not performing as she is expected to. She does notes the PT is very helpful.     Problem list and histories reviewed & adjusted, as indicated.  Additional history: as documented    Patient Active Problem List   Diagnosis     Normal pregnancy in first trimester     Injury of knee, leg, ankle and foot     S/P LEEP (status post loop electrosurgical excision procedure)- MYNOR 3     Gastroesophageal reflux disease without esophagitis     Need for Tdap vaccination     History of gestational hypertension     Elderly multigravida in second trimester     Low back pain during pregnancy in third  "trimester     Past Surgical History:   Procedure Laterality Date     APPENDECTOMY       COSMETIC SURGERY  2010    [facility]      LEEP TX, CERVICAL  1/13/15    MYNOR 3 without extension to margins       Social History     Tobacco Use     Smoking status: Former Smoker     Types: Cigarettes     Last attempt to quit: 2018     Years since quittin.5     Smokeless tobacco: Never Used   Substance Use Topics     Alcohol use: No     Family History   Problem Relation Age of Onset     Depression Mother      Hypertension Father            ROS:  Constitutional, HEENT, cardiovascular, pulmonary, GI, , musculoskeletal, neuro, skin, endocrine and psych systems are negative, except as in HPI or otherwise noted.     This document serves as a record of the services and decisions personally performed and made by Tameka Cruz MD. It was created on her behalf by Nehemias Vernon, a trained medical scribe. The creation of this document is based the provider's statements to the medical scribe.  Nehemias Vernon, 2018 10:50 AM    OBJECTIVE:                                                    /62 (BP Location: Left arm, Patient Position: Chair, Cuff Size: Adult Large)   Pulse 100   Temp 97.3  F (36.3  C) (Temporal)   Resp 16   Ht 1.778 m (5' 10\")   Wt 102.7 kg (226 lb 6.4 oz)   LMP 2018 (Exact Date)   SpO2 97%   Breastfeeding? No   BMI 32.49 kg/m    Body mass index is 32.49 kg/m .   GENERAL: healthy, alert, well nourished, well hydrated, no distress, pregnant  SKIN: no suspicious lesions, no rashes to visible skin  PSYCH: Alert and oriented times 3; speech- coherent , normal rate and volume; able to articulate logical thoughts, able to abstract reason, no tangential thoughts, no hallucinations or delusions, affect- normal  Back Exam     Range of Motion   The patient has normal back ROM.  Extension: normal   Flexion: normal   Lateral bend right: normal   Lateral bend left: normal   Rotation right: normal "   Rotation left: normal     Muscle Strength   The patient has normal back strength.         Diagnostic test results:  No results found for this or any previous visit (from the past 24 hour(s)).     ASSESSMENT/PLAN:                                                        ICD-10-CM    1. Low back pain during pregnancy in third trimester O26.893     M54.5    2. Elderly multigravida in third trimester O09.523      Back Pain:  Patient is much improved regarding back pain, removed work restrictions. She has improved mobility and strength and back appears to be much improved. Though is quite fatigued and worn from pregnancy. Directed back to her ob provider for any other work restrictions on that front. Our restrictions removed.    Length of visit was 16 minutes with more than 50 percent of that time used for discussing medical concerns and education.     The information in this document, created by the medical scribe for me, accurately reflects the services I personally performed and the decisions made by me. I have reviewed and approved this document for accuracy.   MD Tameka Jose MD, MD  [facility]

## 2018-12-17 ENCOUNTER — PRENATAL OFFICE VISIT (OUTPATIENT)
Dept: OBGYN | Facility: OTHER | Age: 33
End: 2018-12-17
Payer: COMMERCIAL

## 2018-12-17 ENCOUNTER — THERAPY VISIT (OUTPATIENT)
Dept: PHYSICAL THERAPY | Facility: CLINIC | Age: 33
End: 2018-12-17
Payer: COMMERCIAL

## 2018-12-17 VITALS
TEMPERATURE: 99.6 F | BODY MASS INDEX: 33.19 KG/M2 | DIASTOLIC BLOOD PRESSURE: 75 MMHG | HEART RATE: 90 BPM | SYSTOLIC BLOOD PRESSURE: 119 MMHG | WEIGHT: 228 LBS

## 2018-12-17 DIAGNOSIS — M54.50 LOW BACK PAIN DURING PREGNANCY IN THIRD TRIMESTER: ICD-10-CM

## 2018-12-17 DIAGNOSIS — Z87.59 HISTORY OF GESTATIONAL HYPERTENSION: ICD-10-CM

## 2018-12-17 DIAGNOSIS — K21.9 GASTROESOPHAGEAL REFLUX DISEASE WITHOUT ESOPHAGITIS: ICD-10-CM

## 2018-12-17 DIAGNOSIS — O09.523 ELDERLY MULTIGRAVIDA IN THIRD TRIMESTER: Primary | ICD-10-CM

## 2018-12-17 DIAGNOSIS — O26.893 LOW BACK PAIN DURING PREGNANCY IN THIRD TRIMESTER: ICD-10-CM

## 2018-12-17 PROCEDURE — 97140 MANUAL THERAPY 1/> REGIONS: CPT | Mod: GP | Performed by: PHYSICAL THERAPIST

## 2018-12-17 PROCEDURE — 97112 NEUROMUSCULAR REEDUCATION: CPT | Mod: GP | Performed by: PHYSICAL THERAPIST

## 2018-12-17 PROCEDURE — 97110 THERAPEUTIC EXERCISES: CPT | Mod: GP | Performed by: PHYSICAL THERAPIST

## 2018-12-17 PROCEDURE — 99207 ZZC PRENATAL VISIT: CPT | Performed by: OBSTETRICS & GYNECOLOGY

## 2018-12-17 NOTE — PROGRESS NOTES
33 year old  at 32w0d weeks presents to the clinic for a routine prenatal visit.    Patient still complains of nausea.  Zofran is helping  Patient denies any vaginal bleeding, leakage of fluid, or contractions     Good fetal movement  Fundal height=31cm  OLRa=776  GERD=stable  Discussed labor precautions.  RTC 2 weeks    Swathi Crisostomo

## 2018-12-19 ENCOUNTER — THERAPY VISIT (OUTPATIENT)
Dept: PHYSICAL THERAPY | Facility: CLINIC | Age: 33
End: 2018-12-19
Payer: COMMERCIAL

## 2018-12-19 DIAGNOSIS — O26.893 LOW BACK PAIN DURING PREGNANCY IN THIRD TRIMESTER: ICD-10-CM

## 2018-12-19 DIAGNOSIS — M54.50 LOW BACK PAIN DURING PREGNANCY IN THIRD TRIMESTER: ICD-10-CM

## 2018-12-19 PROCEDURE — 97112 NEUROMUSCULAR REEDUCATION: CPT | Mod: GP | Performed by: PHYSICAL THERAPIST

## 2018-12-19 PROCEDURE — 97110 THERAPEUTIC EXERCISES: CPT | Mod: GP | Performed by: PHYSICAL THERAPIST

## 2018-12-19 PROCEDURE — 97140 MANUAL THERAPY 1/> REGIONS: CPT | Mod: GP | Performed by: PHYSICAL THERAPIST

## 2018-12-19 NOTE — PROGRESS NOTES
"DISCHARGE REPORT    Progress reporting period is from 11/30/18 to 12/19/18.       SUBJECTIVE  Patient has completed 4 PT visits for this episode of care. Pt states she is having a \"good day\" and pain has been low. Does still get more easily fatigued. Will be talking to MD about going back to full shifts (only doing 4 hour shifts currently). Feels she is ready to do this as long as she does regular stretching throughout shift and wears abdominal support. Will trial independent HEP progression for now.    Current Pain level: 2/10.     Initial Pain level: 7/10.   Changes in function:  Yes (See Goal flowsheet attached for changes in current functional level)  Adverse reaction to treatment or activity: None    OBJECTIVE  Lumbar flexion to mid lower leg +pain (stretching in B HS>pain); Tender/hypertonic B lumbar paraspinals/ QL; good form w/ knee bends, squatting, lunging; No pelvic malalignmnet noted; Hip Abd strength 4+/5 B     ASSESSMENT/PLAN  Updated problem list and treatment plan: Diagnosis 1:  Low back pain in pregnancy (third trimester)  Pain -  self management and home program  Decreased ROM/flexibility - home program  Decreased joint mobility - home program  Decreased strength - home program  Impaired muscle performance - home program  Decreased function - home program  Impaired posture - home program  STG/LTGs have been met or progress has been made towards goals:  Yes (See Goal flow sheet completed today.)  Assessment of Progress: The patient's condition is improving.  Self Management Plans:  Patient is independent in a home treatment program.  Patient  has been instructed in self management of symptoms.  I have re-evaluated this patient and find that PT intervention is no longer required to meet STG/LTG.    Recommendations:  Patient will only return in next 2-3 weeks if condition does not continue to improve; If the patient does not return in this time, she is ready to be discharged from therapy and continue " her home treatment program.      Please refer to the daily flowsheet for treatment today, total treatment time and time spent performing 1:1 timed codes.         As this patient did not return with exacerbation of symptoms, she will be discharged from therapy at this time.

## 2018-12-20 ENCOUNTER — OFFICE VISIT (OUTPATIENT)
Dept: FAMILY MEDICINE | Facility: OTHER | Age: 33
End: 2018-12-20
Payer: COMMERCIAL

## 2018-12-20 VITALS
HEART RATE: 100 BPM | OXYGEN SATURATION: 97 % | SYSTOLIC BLOOD PRESSURE: 108 MMHG | RESPIRATION RATE: 16 BRPM | WEIGHT: 226.4 LBS | HEIGHT: 70 IN | TEMPERATURE: 97.3 F | DIASTOLIC BLOOD PRESSURE: 62 MMHG | BODY MASS INDEX: 32.41 KG/M2

## 2018-12-20 DIAGNOSIS — O26.893 LOW BACK PAIN DURING PREGNANCY IN THIRD TRIMESTER: Primary | ICD-10-CM

## 2018-12-20 DIAGNOSIS — O09.523 ELDERLY MULTIGRAVIDA IN THIRD TRIMESTER: ICD-10-CM

## 2018-12-20 DIAGNOSIS — M54.50 LOW BACK PAIN DURING PREGNANCY IN THIRD TRIMESTER: Primary | ICD-10-CM

## 2018-12-20 PROCEDURE — 99213 OFFICE O/P EST LOW 20 MIN: CPT | Performed by: FAMILY MEDICINE

## 2018-12-20 ASSESSMENT — MIFFLIN-ST. JEOR: SCORE: 1802.19

## 2018-12-20 NOTE — LETTER
[facility]  [address]   [place] [address]  Phone: [phone]    December 20, 2018        Julianne Jenkins  [id number]  [facility] [address]          To whom it may concern:    RE: Julianne Jenkins    Patient may return to work 12/20/2018  with the following:  No lifting restrictions back to restrictions from her OB. Dr Hinds dated: 10/23/18 and prior.    Please contact me for questions or concerns.      Sincerely,        Tameka Cruz MD

## 2018-12-31 ENCOUNTER — PRENATAL OFFICE VISIT (OUTPATIENT)
Dept: OBGYN | Facility: OTHER | Age: 33
End: 2018-12-31
Payer: COMMERCIAL

## 2018-12-31 VITALS — BODY MASS INDEX: 32.14 KG/M2 | WEIGHT: 224 LBS | DIASTOLIC BLOOD PRESSURE: 78 MMHG | SYSTOLIC BLOOD PRESSURE: 122 MMHG

## 2018-12-31 DIAGNOSIS — O09.522 ELDERLY MULTIGRAVIDA IN SECOND TRIMESTER: ICD-10-CM

## 2018-12-31 DIAGNOSIS — Z87.59 HISTORY OF GESTATIONAL HYPERTENSION: ICD-10-CM

## 2018-12-31 PROCEDURE — 99207 ZZC PRENATAL VISIT: CPT | Performed by: OBSTETRICS & GYNECOLOGY

## 2018-12-31 NOTE — PROGRESS NOTES
33 year old  at 34w0d weeks presents to the clinic for a routine prenatal visit.    Patient worked 2 12 hour shifts this weekend and feels she has been corwin since then.   She has had episodes of emesis yesterday and today and constant nausea  I recommend she go to L&D for monitoring and IVF with IV Zofran  No vaginal bleeding or leakage of fluid   Good fetal movement  Fundal height=34cm  XGGq=593's  GERD=stable  Discussed labor precautions.  I recommended patient go to L&D  Discussed with VON Treadwell  All questions answered and patient verbalizes understanding.    Swathi Crisostomo

## 2019-01-11 ENCOUNTER — PRENATAL OFFICE VISIT (OUTPATIENT)
Dept: OBGYN | Facility: CLINIC | Age: 34
End: 2019-01-11
Payer: COMMERCIAL

## 2019-01-11 VITALS
SYSTOLIC BLOOD PRESSURE: 132 MMHG | WEIGHT: 225.1 LBS | HEART RATE: 86 BPM | DIASTOLIC BLOOD PRESSURE: 84 MMHG | BODY MASS INDEX: 32.3 KG/M2

## 2019-01-11 DIAGNOSIS — K21.9 GASTROESOPHAGEAL REFLUX DISEASE WITHOUT ESOPHAGITIS: ICD-10-CM

## 2019-01-11 DIAGNOSIS — O09.529 ELDERLY MULTIGRAVIDA WITH ANTEPARTUM CONDITION OR COMPLICATION: Primary | ICD-10-CM

## 2019-01-11 DIAGNOSIS — Z87.59 HISTORY OF GESTATIONAL HYPERTENSION: ICD-10-CM

## 2019-01-11 LAB
ALBUMIN SERPL-MCNC: 2.8 G/DL (ref 3.4–5)
ALP SERPL-CCNC: 93 U/L (ref 40–150)
ALT SERPL W P-5'-P-CCNC: 45 U/L (ref 0–50)
ANION GAP SERPL CALCULATED.3IONS-SCNC: 11 MMOL/L (ref 3–14)
AST SERPL W P-5'-P-CCNC: 30 U/L (ref 0–45)
BILIRUB SERPL-MCNC: 0.4 MG/DL (ref 0.2–1.3)
BUN SERPL-MCNC: 7 MG/DL (ref 7–30)
CALCIUM SERPL-MCNC: 8.8 MG/DL (ref 8.5–10.1)
CHLORIDE SERPL-SCNC: 107 MMOL/L (ref 94–109)
CO2 SERPL-SCNC: 21 MMOL/L (ref 20–32)
CREAT SERPL-MCNC: 0.61 MG/DL (ref 0.52–1.04)
CREAT UR-MCNC: 174 MG/DL
ERYTHROCYTE [DISTWIDTH] IN BLOOD BY AUTOMATED COUNT: 12.9 % (ref 10–15)
GFR SERPL CREATININE-BSD FRML MDRD: >90 ML/MIN/{1.73_M2}
GLUCOSE SERPL-MCNC: 102 MG/DL (ref 70–99)
HCT VFR BLD AUTO: 36 % (ref 35–47)
HGB BLD-MCNC: 12.3 G/DL (ref 11.7–15.7)
MCH RBC QN AUTO: 30.9 PG (ref 26.5–33)
MCHC RBC AUTO-ENTMCNC: 34.2 G/DL (ref 31.5–36.5)
MCV RBC AUTO: 91 FL (ref 78–100)
PLATELET # BLD AUTO: 273 10E9/L (ref 150–450)
POTASSIUM SERPL-SCNC: 3.5 MMOL/L (ref 3.4–5.3)
PROT SERPL-MCNC: 6.6 G/DL (ref 6.8–8.8)
PROT UR-MCNC: 0.32 G/L
PROT/CREAT 24H UR: 0.19 G/G CR (ref 0–0.2)
RBC # BLD AUTO: 3.98 10E12/L (ref 3.8–5.2)
SODIUM SERPL-SCNC: 139 MMOL/L (ref 133–144)
WBC # BLD AUTO: 11.4 10E9/L (ref 4–11)

## 2019-01-11 PROCEDURE — 80053 COMPREHEN METABOLIC PANEL: CPT | Performed by: OBSTETRICS & GYNECOLOGY

## 2019-01-11 PROCEDURE — 87653 STREP B DNA AMP PROBE: CPT | Performed by: OBSTETRICS & GYNECOLOGY

## 2019-01-11 PROCEDURE — 84156 ASSAY OF PROTEIN URINE: CPT | Performed by: OBSTETRICS & GYNECOLOGY

## 2019-01-11 PROCEDURE — 85027 COMPLETE CBC AUTOMATED: CPT | Performed by: OBSTETRICS & GYNECOLOGY

## 2019-01-11 PROCEDURE — 36415 COLL VENOUS BLD VENIPUNCTURE: CPT | Performed by: OBSTETRICS & GYNECOLOGY

## 2019-01-11 PROCEDURE — 99207 ZZC PRENATAL VISIT: CPT | Performed by: OBSTETRICS & GYNECOLOGY

## 2019-01-11 RX ORDER — ONDANSETRON 8 MG/1
8 TABLET, FILM COATED ORAL EVERY 8 HOURS PRN
Qty: 30 TABLET | Refills: 1 | Status: SHIPPED | OUTPATIENT
Start: 2019-01-11 | End: 2019-05-23

## 2019-01-11 RX ORDER — OMEPRAZOLE 40 MG/1
40 CAPSULE, DELAYED RELEASE ORAL DAILY
Qty: 90 CAPSULE | Refills: 3 | Status: SHIPPED | OUTPATIENT
Start: 2019-01-11 | End: 2019-05-23

## 2019-01-11 NOTE — PROGRESS NOTES
33 year old  at 35w4d weeks presents to the clinic for a routine prenatal visit.  Patient complains of daily N/V.  She is trying to eat small meals but still vomits.  She is vomiting at work every time now.  She works 3 8 hour night shifts in a row  Blood pressure=132/84.  Patient complains of a slight headache.  Will check a urine Pr/Cr today and PIH labs  Patient is taking Reglan and Zofran.  We discussed trying to add Unisom/Vit B 6 again  No vaginal bleeding, leakage of fluid, or contractions  Fundal height=36cm  VMUn=336's  CX=Cl/20/-3  Vertex by BSUS  GBS done today  GERD=Prilosec increased  Labor precautions discussed  RTC 1 week    Swathi Crisostomo

## 2019-01-12 LAB
GP B STREP DNA SPEC QL NAA+PROBE: NEGATIVE
SPECIMEN SOURCE: NORMAL

## 2019-01-14 DIAGNOSIS — O21.9 NAUSEA AND VOMITING IN PREGNANCY: ICD-10-CM

## 2019-01-14 DIAGNOSIS — K21.9 GASTROESOPHAGEAL REFLUX DISEASE WITHOUT ESOPHAGITIS: ICD-10-CM

## 2019-01-15 RX ORDER — ONDANSETRON 8 MG/1
8 TABLET, FILM COATED ORAL EVERY 8 HOURS PRN
Qty: 30 TABLET | Refills: 0 | Status: SHIPPED | OUTPATIENT
Start: 2019-01-15 | End: 2019-02-20

## 2019-01-15 NOTE — TELEPHONE ENCOUNTER
" Antivertigo/Antiemetic Agents Passed   ondansetron (ZOFRAN) 8 MG tablet   Rerun Protocol (1/14/2019 2:17 PM)      Recent (12 mo) or future (30 days) visit within the authorizing provider's specialty      Patient had office visit in the last 12 months or has a visit in the next 30 days with authorizing provider or within the authorizing provider's specialty.  See \"Patient Info\" tab in inbasket, or \"Choose Columns\" in Meds & Orders section of the refill encounter.              Medication is active on med list         Patient is 18 years of age or older        Prescription approved per [facility] Refill Protocol.        PPI Protocol Failed   omeprazole (PRILOSEC) 20 MG DR capsule   Rerun Protocol (1/14/2019 2:17 PM)      No active pregnacy on record         No positive pregnancy test in past 12 months         Not on Clopidogrel (unless Pantoprazole ordered)         No diagnosis of osteoporosis on record         Recent (12 mo) or future (30 days) visit within the authorizing provider's specialty      Patient had office visit in the last 12 months or has a visit in the next 30 days with authorizing provider or within the authorizing provider's specialty.  See \"Patient Info\" tab in inbasket, or \"Choose Columns\" in Meds & Orders section of the refill encounter.              Medication is active on med list         Patient is age 18 or older        Routing refill request to provider for review/approval because:  Pt is currently pregnant.            "

## 2019-01-17 ENCOUNTER — PRENATAL OFFICE VISIT (OUTPATIENT)
Dept: OBGYN | Facility: OTHER | Age: 34
End: 2019-01-17
Payer: COMMERCIAL

## 2019-01-17 VITALS
DIASTOLIC BLOOD PRESSURE: 84 MMHG | WEIGHT: 230 LBS | BODY MASS INDEX: 33 KG/M2 | TEMPERATURE: 98.2 F | SYSTOLIC BLOOD PRESSURE: 128 MMHG

## 2019-01-17 DIAGNOSIS — O13.3 GESTATIONAL HYPERTENSION WITHOUT SIGNIFICANT PROTEINURIA IN THIRD TRIMESTER: Primary | ICD-10-CM

## 2019-01-17 DIAGNOSIS — Z98.890 S/P LEEP (STATUS POST LOOP ELECTROSURGICAL EXCISION PROCEDURE): ICD-10-CM

## 2019-01-17 DIAGNOSIS — Z87.59 HISTORY OF GESTATIONAL HYPERTENSION: ICD-10-CM

## 2019-01-17 DIAGNOSIS — K21.9 GASTROESOPHAGEAL REFLUX DISEASE WITHOUT ESOPHAGITIS: ICD-10-CM

## 2019-01-17 DIAGNOSIS — O09.522 ELDERLY MULTIGRAVIDA IN SECOND TRIMESTER: ICD-10-CM

## 2019-01-17 LAB
CREAT UR-MCNC: 184 MG/DL
PROT UR-MCNC: 0.32 G/L
PROT/CREAT 24H UR: 0.17 G/G CR (ref 0–0.2)

## 2019-01-17 PROCEDURE — 84156 ASSAY OF PROTEIN URINE: CPT | Performed by: OBSTETRICS & GYNECOLOGY

## 2019-01-17 PROCEDURE — 99207 ZZC PRENATAL VISIT: CPT | Performed by: OBSTETRICS & GYNECOLOGY

## 2019-01-17 NOTE — LETTER
Julianne Jenkins  [id number] [place] [facility] [place] [address]  [phone] (home)     : 1985      To Whom it May Concern:    Julianne Jenkins was seen in our office today.  Please excuse her from work for the remainder of the pregnancy due to hypertension.        Please contact me for questions or concerns.    Sincerely,        Swathi Crisostomo

## 2019-01-17 NOTE — TELEPHONE ENCOUNTER
I just sent in a prescription for Prilosec 40mg.  Does patient want the 20mg instead?    Swathi Crisostomo

## 2019-01-17 NOTE — PROGRESS NOTES
33 year old  at 36w3d weeks presents to the clinic for a routine prenatal visit.  No vaginal bleeding, leakage of fluid, or contractions  Blood pressure=150/96 at home yesterday.  Today 136/90.  Repeat was 128/84  145/93, 146/104, 110/74 at home are some blood pressures over the last day  Patient has been having headaches for a week.  Patient takes Tylenol which resolves them for 4 hours or so.  Still having some N/V.  No RUQ or epigastric pain.  Will check a urine today  History of GHTN.  We discussed likely dx of GHTN again and induction at 37 weeks.  Will await Urine Pr/Cr before scheduling induction  Anxiety=increased especially now that the blood pressure is high  Fundal height=37cm  FHTs=160's  CX=Cl/80/-3  GBS=negative  GERD=stable  Labor precautions discussed    Swathi Crisostomo

## 2019-01-17 NOTE — TELEPHONE ENCOUNTER
Spoke with patient and she stated that it was upped and that she has already picked up the medication from the pharmacy    Alis Solis  Women's Health

## 2019-01-18 ENCOUNTER — MYC MEDICAL ADVICE (OUTPATIENT)
Dept: OBGYN | Facility: CLINIC | Age: 34
End: 2019-01-18

## 2019-01-18 ENCOUNTER — TELEPHONE (OUTPATIENT)
Dept: OBGYN | Facility: CLINIC | Age: 34
End: 2019-01-18

## 2019-01-18 NOTE — TELEPHONE ENCOUNTER
Discussed with Julianne the risks, benefits and alternatives to induction.  We discussed cervical ripening for those patients with a mccauley score of less than 6 (for multiparous) and 8 (for nulliparous).  Discussed the concerns related to uterine hyperstimulation and adverse fetal effects that can occur with a ripening agent or pitocin. Discussed amniotomy and the rationale for it with induction.  I discussed the expected timeline for her based on her presentation, but she understands that this is just an approximate.  She is given the opportunity to ask questions and have them answered.  She does wish to proceed    Induction scheduled for January 20.

## 2019-01-22 ENCOUNTER — TRANSFERRED RECORDS (OUTPATIENT)
Dept: HEALTH INFORMATION MANAGEMENT | Facility: CLINIC | Age: 34
End: 2019-01-22

## 2019-02-01 ENCOUNTER — MYC MEDICAL ADVICE (OUTPATIENT)
Dept: OBGYN | Facility: OTHER | Age: 34
End: 2019-02-01

## 2019-02-01 ENCOUNTER — TELEPHONE (OUTPATIENT)
Dept: OBGYN | Facility: OTHER | Age: 34
End: 2019-02-01

## 2019-02-01 NOTE — TELEPHONE ENCOUNTER
Reason for Call:  Form, our goal is to have forms completed with 72 hours, however, some forms may require a visit or additional information.     Type of letter, form or note:  medical     Who is the form from?: [facility] group (if other please explain)     Where did the form come from: form was faxed in     What clinic location was the form placed at?: [facility] - [phone]     Where the form was placed: [facility]     What number is listed as a contact on the form?: [phone]     Additional comments: please complete form sign date and return to fax [phone]     Call taken on 2/1/2019 at 11:49 AM by Natalya Dukes

## 2019-02-01 NOTE — TELEPHONE ENCOUNTER
My Chart message sent to patient.    Please notify when forms are completed and faxed.    Oneil Dalal, HARINDER

## 2019-02-01 NOTE — TELEPHONE ENCOUNTER
Sorry to add to my previously sent message about the [employer] forms, could I receive a response via [email or web address] when the forms are sent?   Thanks again     Additional Bizily message.

## 2019-02-01 NOTE — TELEPHONE ENCOUNTER
Reason for Call:  Form, our goal is to have forms completed with 72 hours, however, some forms may require a visit or additional information.    Type of letter, form or note:  medical    Who is the form from?: [facility] group (if other please explain)    Where did the form come from: form was faxed in    What clinic location was the form placed at?: [facility] - [phone]    Where the form was placed: [facility]    What number is listed as a contact on the form?: [phone]       Additional comments: please complete form sign date and return to fax [phone]    Call taken on 2/1/2019 at 11:49 AM by Natalya Dukes

## 2019-02-04 NOTE — TELEPHONE ENCOUNTER
Forms received in [place]. Will have provider sign once filled out    Alis Solis  Women's Health

## 2019-02-04 NOTE — TELEPHONE ENCOUNTER
Form filled out and signed by Dr. Crisostomo and faxed back to [facility] Solis  Women's Health

## 2019-02-18 DIAGNOSIS — O21.9 NAUSEA AND VOMITING IN PREGNANCY: ICD-10-CM

## 2019-02-19 NOTE — TELEPHONE ENCOUNTER
Routing refill request to provider for review/approval because:  Looks like pt has delivered her baby.    Faye Trivedi RN

## 2019-02-20 RX ORDER — ONDANSETRON 8 MG/1
8 TABLET, FILM COATED ORAL EVERY 8 HOURS PRN
Qty: 30 TABLET | Refills: 0 | Status: SHIPPED | OUTPATIENT
Start: 2019-02-20 | End: 2020-01-22

## 2019-03-04 ENCOUNTER — PRENATAL OFFICE VISIT (OUTPATIENT)
Dept: OBGYN | Facility: OTHER | Age: 34
End: 2019-03-04
Payer: COMMERCIAL

## 2019-03-04 VITALS
DIASTOLIC BLOOD PRESSURE: 82 MMHG | SYSTOLIC BLOOD PRESSURE: 118 MMHG | BODY MASS INDEX: 31.06 KG/M2 | WEIGHT: 216.5 LBS | HEART RATE: 88 BPM

## 2019-03-04 DIAGNOSIS — Z30.013 ENCOUNTER FOR INITIAL PRESCRIPTION OF INJECTABLE CONTRACEPTIVE: ICD-10-CM

## 2019-03-04 PROBLEM — O26.893 LOW BACK PAIN DURING PREGNANCY IN THIRD TRIMESTER: Status: RESOLVED | Noted: 2018-11-30 | Resolved: 2019-03-04

## 2019-03-04 PROBLEM — O09.522 ELDERLY MULTIGRAVIDA IN SECOND TRIMESTER: Status: RESOLVED | Noted: 2018-08-17 | Resolved: 2019-03-04

## 2019-03-04 PROBLEM — Z23 NEED FOR TDAP VACCINATION: Status: RESOLVED | Noted: 2018-07-17 | Resolved: 2019-03-04

## 2019-03-04 PROBLEM — M54.50 LOW BACK PAIN DURING PREGNANCY IN THIRD TRIMESTER: Status: RESOLVED | Noted: 2018-11-30 | Resolved: 2019-03-04

## 2019-03-04 PROCEDURE — 99207 ZZC POST PARTUM EXAM: CPT | Performed by: OBSTETRICS & GYNECOLOGY

## 2019-03-04 PROCEDURE — 96372 THER/PROPH/DIAG INJ SC/IM: CPT | Performed by: OBSTETRICS & GYNECOLOGY

## 2019-03-04 RX ORDER — MEDROXYPROGESTERONE ACETATE 150 MG/ML
150 INJECTION, SUSPENSION INTRAMUSCULAR
Status: DISCONTINUED | OUTPATIENT
Start: 2019-03-04 | End: 2019-05-23

## 2019-03-04 RX ORDER — IBUPROFEN 600 MG/1
TABLET, FILM COATED ORAL
Refills: 0 | COMMUNITY
Start: 2019-01-25

## 2019-03-04 RX ADMIN — MEDROXYPROGESTERONE ACETATE 150 MG: 150 INJECTION, SUSPENSION INTRAMUSCULAR at 11:31

## 2019-03-04 ASSESSMENT — PATIENT HEALTH QUESTIONNAIRE - PHQ9: SUM OF ALL RESPONSES TO PHQ QUESTIONS 1-9: 0

## 2019-03-04 NOTE — PROGRESS NOTES
The following medication was given:     MEDICATION: Depo Provera 150mg  ROUTE: IM  SITE: [facility]  DOSE: 150 mg  LOT #: [id number]  :  [employer]  EXPIRATION DATE:  02/2020  NDC#: 92010-268-10  Next Due 05/20-06/03/2019  Laura Cisneros [facility]

## 2019-03-04 NOTE — PROGRESS NOTES
Subjective  33 year old non-pregnant female presents today for a postpartum visit.  Patient had a primary c section on 19 for failure to progress.  No pain.  No vaginal bleeding.  No problems urinating.  Normal bowel movements.  Patient is breast feeding.  No signs and symptoms of ppd.  Patient scored a 0 on the PHQ-9.  No thoughts of suicide or infanticide.  Patient is not due for a pap smear today.  Patient is wanting to do Depo for birth control until her  has a vasectomy.       ROS: 10 point ROS neg other than the symptoms noted above in the HPI.  Past Medical History:   Diagnosis Date     ASCUS with positive high risk HPV 13     ASCUS with positive high risk HPV 14     H/O colposcopy with cervical biopsy 3/5/13    MYNOR 1     H/O colposcopy with cervical biopsy 14    MYNOR 3     LSIL (low grade squamous intraepithelial lesion) on Pap smear 7/19/10     LSIL (low grade squamous intraepithelial lesion) on Pap smear 13    cannot rule out HSIL     Past Surgical History:   Procedure Laterality Date     APPENDECTOMY       COSMETIC SURGERY      [facility]      LEEP TX, CERVICAL  1/13/15    MYNOR 3 without extension to margins     Family History   Problem Relation Age of Onset     Depression Mother      Hypertension Father      Social History     Tobacco Use     Smoking status: Former Smoker     Types: Cigarettes     Last attempt to quit: 2018     Years since quittin.7     Smokeless tobacco: Never Used   Substance Use Topics     Alcohol use: No         Objective  Vitals: /82   Pulse 88   Wt 98.2 kg (216 lb 8 oz)   LMP 2018 (Exact Date)   Breastfeeding? Yes   BMI 31.06 kg/m    BMI= Body mass index is 31.06 kg/m .         Abd=soft, Nontender/nondistended, incision=healed well        Assessment  1.)  Post partum visit  2.)  S/p Primary C section on 19 for failure to progress  3.)  Birth control   4.)  GERD       Plan  1.)  Depo  2.)  Continue  Mahsa Crisostomo

## 2019-03-04 NOTE — PATIENT INSTRUCTIONS
If you have any questions regarding your visit, Please contact your care team.    Women s Health CLINIC HOURS TELEPHONE NUMBER   Swathi Crsiostomo M.D.    Whitney Sampson -         Monday-[facility]  7:00 am - 5 pm Monday's Tuesday- [facility]  8:00am- 5 pm  Wednesday- Off  Thursday- Offf Friday-Am [place], and [facility]  [place] 8:00-11:30 AM  [place] 1:00-5:00 PM [facility]  [address] [place] [address]  [phone] ask for [facility]    Imaging [phone]    [facility]  [address]  [place], [place] [address] [phone]  Imaging [phone]    [facility]  [address] [place], [place] [id number] [phone]  Imaging Scheduling - [phone]     Urgent Care locations:    [facility] Saturday and Sunday   9 am - 5 pm    Monday-Friday   12 pm - 8 pm  Saturday and Sunday   9 am - 5 pm   [phone]       If you need a medication refill, please contact your pharmacy. Please allow 3 business days for your refill to be completed.  As always, Thank you for trusting us with your healthcare needs!

## 2019-05-23 ENCOUNTER — OFFICE VISIT (OUTPATIENT)
Dept: FAMILY MEDICINE | Facility: OTHER | Age: 34
End: 2019-05-23
Payer: COMMERCIAL

## 2019-05-23 VITALS
TEMPERATURE: 98.6 F | SYSTOLIC BLOOD PRESSURE: 118 MMHG | HEIGHT: 70 IN | OXYGEN SATURATION: 98 % | DIASTOLIC BLOOD PRESSURE: 90 MMHG | HEART RATE: 94 BPM | RESPIRATION RATE: 16 BRPM | BODY MASS INDEX: 31.28 KG/M2 | WEIGHT: 218.5 LBS

## 2019-05-23 DIAGNOSIS — Z12.4 SCREENING FOR MALIGNANT NEOPLASM OF CERVIX: ICD-10-CM

## 2019-05-23 DIAGNOSIS — F41.8 POSTPARTUM ANXIETY: ICD-10-CM

## 2019-05-23 DIAGNOSIS — Z87.59 HISTORY OF GESTATIONAL HYPERTENSION: ICD-10-CM

## 2019-05-23 PROCEDURE — 99213 OFFICE O/P EST LOW 20 MIN: CPT | Performed by: FAMILY MEDICINE

## 2019-05-23 RX ORDER — PAROXETINE 10 MG/1
TABLET, FILM COATED ORAL
Qty: 60 TABLET | Refills: 0 | Status: SHIPPED | OUTPATIENT
Start: 2019-05-23 | End: 2019-07-03 | Stop reason: DRUGHIGH

## 2019-05-23 ASSESSMENT — ANXIETY QUESTIONNAIRES
6. BECOMING EASILY ANNOYED OR IRRITABLE: NEARLY EVERY DAY
1. FEELING NERVOUS, ANXIOUS, OR ON EDGE: NEARLY EVERY DAY
5. BEING SO RESTLESS THAT IT IS HARD TO SIT STILL: SEVERAL DAYS
GAD7 TOTAL SCORE: 13
7. FEELING AFRAID AS IF SOMETHING AWFUL MIGHT HAPPEN: NOT AT ALL
4. TROUBLE RELAXING: MORE THAN HALF THE DAYS
3. WORRYING TOO MUCH ABOUT DIFFERENT THINGS: MORE THAN HALF THE DAYS
2. NOT BEING ABLE TO STOP OR CONTROL WORRYING: MORE THAN HALF THE DAYS
7. FEELING AFRAID AS IF SOMETHING AWFUL MIGHT HAPPEN: NOT AT ALL

## 2019-05-23 ASSESSMENT — PATIENT HEALTH QUESTIONNAIRE - PHQ9
SUM OF ALL RESPONSES TO PHQ QUESTIONS 1-9: 7
SUM OF ALL RESPONSES TO PHQ QUESTIONS 1-9: 7
10. IF YOU CHECKED OFF ANY PROBLEMS, HOW DIFFICULT HAVE THESE PROBLEMS MADE IT FOR YOU TO DO YOUR WORK, TAKE CARE OF THINGS AT HOME, OR GET ALONG WITH OTHER PEOPLE: VERY DIFFICULT

## 2019-05-23 ASSESSMENT — MIFFLIN-ST. JEOR: SCORE: 1758.42

## 2019-05-23 NOTE — PROGRESS NOTES
"  SUBJECTIVE: Julianne is here for a 6-week postpartum checkup.    Date of Last Pap:  ***    Delivery date was ***. She had a {OB DELIVERY METHOD:::\"\"} of a viable {BOY/GIRL:665327}, weight *** pounds *** oz., with {OB DETAILS:8110} complications.  Since delivery, she {HAS/HAS NOT:9025::\"has\"} been breast feeding.  She has {NO/BLANK:089251::\"no\"} signs of infection, bleeding or other complications.  We discussed contraceptions and she has chosen {CONTRACEPTION:883974}.  She  {HAS/HAS NOT:9025::\"has\"} had intercourse since delivery and complains of {DEGREE - MILD, MOD, SEV:898899::\"moderate\"} discomfort. Patient screened for postpartum depression and complaints are { EXAM PSYCH:393809}. Screening has also been completed for intimate partner violence.      EXAM:  {EXAM COMPLETE FEMALE:383181::\"  GENERAL APPEARANCE: healthy, alert and no distress\",\"   EYES: EOMI, PERRL\",\"   HENT: ear canals and TM's normal and nose and mouth without ulcers or lesions\",\"   NECK: no adenopathy, no asymmetry, masses, or scars and thyroid normal to palpation\",\"   RESP: lungs clear to auscultation - no rales, rhonchi or wheezes\",\"   CV: regular rates and rhythm, normal S1 S2, no S3 or S4 and no murmur, click or rub\",\"   ABDOMEN:  soft, nontender, no HSM or masses and bowel sounds normal\",\"   MS: extremities normal- no gross deformities noted, no evidence of inflammation in joints, FROM in all extremities.\",\"   SKIN: no suspicious lesions or rashes\",\"   NEURO: Normal strength and tone, sensory exam grossly normal, mentation intact and speech normal\",\"   PSYCH: mentation appears normal. and affect normal/bright\",\"   LYMPHATICS: No cervical adenopathy\"}    ASSESSMENT:   Normal postpartum exam after {OB DELIVERY METHOD:::\"\"}.    PLAN:  Return as needed or at time of next expected pap, pelvic, or breast exam.    "

## 2019-05-23 NOTE — PROGRESS NOTES
"Subjective     Julianne Jenkins is a 33 year old female who presents to clinic today for the following health issues:    HPI   Abnormal Mood Symptoms  Onset: 6 weeks     Description:   Depression: YES  Anxiety: YES    Accompanying Signs & Symptoms:  Still participating in activities that you used to enjoy: YES  Fatigue: YES  Irritability: YES  Difficulty concentrating: no   Changes in appetite: no  Problems with sleep: no  Heart racing/beating fast : YES  Thoughts of hurting yourself or others: none    History:   Recent stress: YES  Prior depression hospitalization: None  Family history of depression: YES  Family history of anxiety: YES    Precipitating factors:   Alcohol/drug use: no     Alleviating factors:    Therapies Tried and outcome: Zoloft (Sertraline) Did not help     Answers for HPI/ROS submitted by the patient on 5/23/2019   If you checked off any problems, how difficult have these problems made it for you to do your work, take care of things at home, or get along with other people?: Very difficult  PHQ9 TOTAL SCORE: 7  ROSETTE 7 TOTAL SCORE: 13    She reports she has started to have panic attacks about once a week and wants to \"run away from life\". She reports she is getting worse every day she wakes up and she just doesn't want to be around her family. She is taking some personal time to focus on herself, but it doesn't feel like enough. She feels she is ready to consider medications, because it has gotten out of her control. She doesn't have a desire to see a counselor at this time.     Patient Active Problem List   Diagnosis     Injury of knee, leg, ankle and foot     S/P LEEP (status post loop electrosurgical excision procedure)- MYNOR 3     Gastroesophageal reflux disease without esophagitis     History of gestational hypertension     Past Surgical History:   Procedure Laterality Date     APPENDECTOMY       COSMETIC SURGERY  2010    [facility]      LEEP TX, CERVICAL  1/13/15    MYNOR 3 without extension to " "margins       Social History     Tobacco Use     Smoking status: Former Smoker     Types: Cigarettes     Last attempt to quit: 2018     Years since quittin.9     Smokeless tobacco: Never Used   Substance Use Topics     Alcohol use: No     Family History   Problem Relation Age of Onset     Depression Mother      Hypertension Father            Reviewed and updated as needed this visit by Provider         Review of Systems   ROS COMP: Constitutional, HEENT, cardiovascular, pulmonary, GI, , musculoskeletal, neuro, skin, endocrine and psych systems are negative, except as otherwise noted.      Objective    /90 (BP Location: Right arm, Patient Position: Chair, Cuff Size: Adult Large)   Pulse 94   Temp 98.6  F (37  C) (Temporal)   Resp 16   Ht 1.765 m (5' 9.5\")   Wt 99.1 kg (218 lb 8 oz)   SpO2 98%   Breastfeeding? Yes   BMI 31.80 kg/m    Body mass index is 31.8 kg/m .  Physical Exam   GENERAL: healthy, alert and no distress, obese, tearful   EYES: Eyes grossly normal to inspection, conjunctivae and sclerae normal  MS: no gross musculoskeletal defects noted, no edema  SKIN: no suspicious lesions or rashes to visible skin  NEURO: Normal strength and tone, mentation intact and speech normal  PSYCH: mentation appears normal, affect anxious.     Diagnostic Test Results:  No results found for this or any previous visit (from the past 24 hour(s)).        Assessment & Plan       ICD-10-CM    1. Routine postpartum follow-up Z39.2    2. Screening for malignant neoplasm of cervix Z12.4    3. History of gestational hypertension Z87.59      Mood:  Julianne continues to have significant anxiety and depression which has gotten out of her control without the aid of medications. She has started having thoughts of being without her family. Though she has not had thoughts of self-harm, she is still considered critical and we discussed her options and made sure she was aware of her resources and feels secure. She had " "low-libido and severe fatigue with use of sertraline in the past. Will start paroxetine today. Follow-up in 3-4 weeks, planned to be an e-visit.      BMI:   Estimated body mass index is 31.8 kg/m  as calculated from the following:    Height as of this encounter: 1.765 m (5' 9.5\").    Weight as of this encounter: 99.1 kg (218 lb 8 oz).   Weight management plan: Discussed healthy diet and exercise guidelines    Length of visit was 17 minutes with more than 50 percent of that time used for discussing medical concerns and education.     Return in about 1 month (around 6/20/2019) for Mood Recheck.    This document serves as a record of the services and decisions personally performed and made by Tameka Cruz MD. It was created on her behalf by Nehemias Vernon, a trained medical scribe. The creation of this document is based the provider's statements to the medical scribe.  Nehemias Vernon, May 23, 2019 3:45 PM    The information in this document, created by the medical scribe for me, accurately reflects the services I personally performed and the decisions made by me. I have reviewed and approved this document for accuracy.   MD Tameka Jose MD, MD  [facility]    "

## 2019-05-23 NOTE — PATIENT INSTRUCTIONS
Crisis Hotline  [phone]    Discussed need for gradual increase of SSRI dose over time, titrating to effect.  Reviewed potential for initial side effects (such as headache, GI symptoms, and dry mouth) that will likely subside after a week or so, but that therapeutic effects will likely take 1-2 weeks - so it's important to stick with medication for at least a month to adequately gauge effect.  Notify me of any significant side effects.  Discussed that treatment with SSRI medications requires a minimum commitment of 9-12 months; shorter courses are associated with rebound symptoms.  Discussed potential long-term side effects including sexual side effects.     Postpartum Depression - English

## 2019-05-24 ASSESSMENT — ANXIETY QUESTIONNAIRES: GAD7 TOTAL SCORE: 13

## 2019-05-24 ASSESSMENT — PATIENT HEALTH QUESTIONNAIRE - PHQ9: SUM OF ALL RESPONSES TO PHQ QUESTIONS 1-9: 7

## 2019-05-31 PROBLEM — F41.8 POSTPARTUM ANXIETY: Status: ACTIVE | Noted: 2019-05-31

## 2019-06-29 ENCOUNTER — MYC REFILL (OUTPATIENT)
Dept: FAMILY MEDICINE | Facility: OTHER | Age: 34
End: 2019-06-29

## 2019-06-29 DIAGNOSIS — F41.8 POSTPARTUM ANXIETY: ICD-10-CM

## 2019-06-29 RX ORDER — PAROXETINE 10 MG/1
TABLET, FILM COATED ORAL
Qty: 60 TABLET | Refills: 0 | Status: CANCELLED | OUTPATIENT
Start: 2019-06-29

## 2019-07-01 NOTE — TELEPHONE ENCOUNTER
"Requested Prescriptions   Pending Prescriptions Disp Refills     PARoxetine (PAXIL) 10 MG tablet 60 tablet 0     Sig: Take 1 tab daily for 1-2 weeks, 2 tabs daily for 2 weeks       SSRIs Protocol Passed - 6/29/2019 12:18 PM        Passed - Recent (12 mo) or future (30 days) visit within the authorizing provider's specialty     Patient had office visit in the last 12 months or has a visit in the next 30 days with authorizing provider or within the authorizing provider's specialty.  See \"Patient Info\" tab in inbasket, or \"Choose Columns\" in Meds & Orders section of the refill encounter.         Passed - Medication is active on med list        Passed - Patient is age 18 or older        Passed - No active pregnancy on record        Passed - No positive pregnancy test in last 12 months          Last OV: 05/23/2019    Routing refill request to provider for review/approval because:  Patient needs to be seen because:  Most recent OV note said follow up after 1 month for re-check.    Bashir Petty, RN, BSN    "

## 2019-07-02 NOTE — TELEPHONE ENCOUNTER
Ok for telephone encounter tomorrow.  I will leave this inbasket for other providers to determine if they are able to provide visit today.     Letty Reyes PA-C

## 2019-07-02 NOTE — TELEPHONE ENCOUNTER
Patient returned call, because AE is out th rest of the week, patient is requesting this by today? I think maybe tomorrow she said as she is going to be out of town. Covering providers okay to be doing this as a telephone visit instead?  Vicki Parikh MA

## 2019-07-03 ENCOUNTER — VIRTUAL VISIT (OUTPATIENT)
Dept: FAMILY MEDICINE | Facility: OTHER | Age: 34
End: 2019-07-03
Payer: COMMERCIAL

## 2019-07-03 DIAGNOSIS — F41.8 POSTPARTUM ANXIETY: Primary | ICD-10-CM

## 2019-07-03 PROCEDURE — 99441 ZZC PHYSICIAN TELEPHONE EVALUATION 5-10 MIN: CPT | Performed by: PHYSICIAN ASSISTANT

## 2019-07-03 RX ORDER — PAROXETINE 20 MG/1
20 TABLET, FILM COATED ORAL DAILY
Qty: 90 TABLET | Refills: 1 | Status: SHIPPED | OUTPATIENT
Start: 2019-07-03 | End: 2019-12-24

## 2019-07-03 ASSESSMENT — PATIENT HEALTH QUESTIONNAIRE - PHQ9
SUM OF ALL RESPONSES TO PHQ QUESTIONS 1-9: 2
5. POOR APPETITE OR OVEREATING: SEVERAL DAYS

## 2019-07-03 ASSESSMENT — ANXIETY QUESTIONNAIRES
IF YOU CHECKED OFF ANY PROBLEMS ON THIS QUESTIONNAIRE, HOW DIFFICULT HAVE THESE PROBLEMS MADE IT FOR YOU TO DO YOUR WORK, TAKE CARE OF THINGS AT HOME, OR GET ALONG WITH OTHER PEOPLE: NOT DIFFICULT AT ALL
GAD7 TOTAL SCORE: 3
5. BEING SO RESTLESS THAT IT IS HARD TO SIT STILL: NOT AT ALL
6. BECOMING EASILY ANNOYED OR IRRITABLE: SEVERAL DAYS
7. FEELING AFRAID AS IF SOMETHING AWFUL MIGHT HAPPEN: NOT AT ALL
2. NOT BEING ABLE TO STOP OR CONTROL WORRYING: NOT AT ALL
1. FEELING NERVOUS, ANXIOUS, OR ON EDGE: SEVERAL DAYS
3. WORRYING TOO MUCH ABOUT DIFFERENT THINGS: NOT AT ALL

## 2019-07-03 NOTE — PROGRESS NOTES
"Julainne Jenkins is a 34 year old female who is being evaluated via a billable telephone visit.      The patient has been notified of following:     \"This telephone visit will be conducted via a call between you and your physician/provider. We have found that certain health care needs can be provided without the need for a physical exam.  This service lets us provide the care you need with a short phone conversation.  If a prescription is necessary we can send it directly to your pharmacy.  If lab work is needed we can place an order for that and you can then stop by our lab to have the test done at a later time.    If during the course of the call the physician/provider feels a telephone visit is not appropriate, you will not be charged for this service.\"     Consent has been obtained for this service by 1 care team member: yes. See the scanned image in the medical record.    Julianne Jenkins complains of    Chief Complaint   Patient presents with     Refill Request       I have reviewed and updated the patient's Past Medical History, Social History, Family History and Medication List.    ALLERGIES  Vicodin [hydrocodone-acetaminophen]       Vicki Parikh MA   (MA signature)    Anxiety Follow-Up    How are you doing with your anxiety since your last visit? Improved     Now finding enjoyment in life, she can laugh, not feeling void of emotions, not feeling like she wants to leave, enjoying her children, wanting to be with others.      Are you having other symptoms that might be associated with anxiety? No    Have you had a significant life event? OTHER: Birth of child     Are you feeling depressed? No not anymore    Do you have any concerns with your use of alcohol or other drugs? No     Thinks she has postpartum depression last time but was in denial.  This time symptoms were much worse.     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     Had " headaches initially when starting Paxil, doing well now on 20 mg without side effects.  Happy with dose.     Social History     Tobacco Use     Smoking status: Former Smoker     Types: Cigarettes     Last attempt to quit: 2018     Years since quittin.0     Smokeless tobacco: Never Used   Substance Use Topics     Alcohol use: No     Drug use: No     ROSETTE-7 SCORE 2019 7/3/2019   Total Score 13 (moderate anxiety) -   Total Score 13 3     PHQ 3/4/2019 2019 7/3/2019   PHQ-9 Total Score 0 7 2   Q9: Thoughts of better off dead/self-harm past 2 weeks Not at all Not at all Not at all         Assessment/Plan:  (O99.345,  F41.8) Postpartum anxiety  (primary encounter diagnosis)  Comment: Doing well  Plan: Continue Paxil 20 mg once daily.  Recommend follow-up in 6 months, sooner if worse or new concerns.     I have reviewed the note as documented above.  This accurately captures the substance of my conversation with the patient.    Total time of call between patient and provider was 5:32 minutes     Letty Reyes PA-C

## 2019-07-04 ASSESSMENT — ANXIETY QUESTIONNAIRES: GAD7 TOTAL SCORE: 3

## 2019-11-09 ENCOUNTER — HEALTH MAINTENANCE LETTER (OUTPATIENT)
Age: 34
End: 2019-11-09

## 2019-11-21 ENCOUNTER — IMMUNIZATION (OUTPATIENT)
Dept: FAMILY MEDICINE | Facility: OTHER | Age: 34
End: 2019-11-21
Payer: COMMERCIAL

## 2019-11-21 DIAGNOSIS — Z23 NEED FOR PROPHYLACTIC VACCINATION AND INOCULATION AGAINST INFLUENZA: Primary | ICD-10-CM

## 2019-11-21 PROCEDURE — 90471 IMMUNIZATION ADMIN: CPT

## 2019-11-21 PROCEDURE — 99207 ZZC NO CHARGE NURSE ONLY: CPT

## 2019-11-21 PROCEDURE — 90686 IIV4 VACC NO PRSV 0.5 ML IM: CPT

## 2019-12-23 DIAGNOSIS — F41.8 POSTPARTUM ANXIETY: ICD-10-CM

## 2019-12-24 RX ORDER — PAROXETINE 20 MG/1
20 TABLET, FILM COATED ORAL DAILY
Qty: 30 TABLET | Refills: 0 | Status: SHIPPED | OUTPATIENT
Start: 2019-12-24 | End: 2020-01-22

## 2019-12-24 NOTE — TELEPHONE ENCOUNTER
Pending Prescriptions:                       Disp   Refills    PARoxetine (PAXIL) 20 MG tablet           30 tab*0            Sig: Take 1 tablet (20 mg) by mouth daily    Medication is being filled for 1 time refill only due to:  Patient needs to be seen because due for medication recheck OV. .Please call and assist patient in scheduling.       Elizabeth Najera RN BSN

## 2020-01-17 NOTE — PROGRESS NOTES
Subjective     Julianne Jenkins is a 34 year old female who presents to clinic today for the following health issues:    History of Present Illness        Mental Health Follow-up:  Patient presents to follow-up on Depression & Anxiety.Patient's depression since last visit has been:  Better  The patient is not having other symptoms associated with depression.  Patient's anxiety since last visit has been:  Medium  The patient is having other symptoms associated with anxiety.  Any significant life events: relationship concerns and job concerns  Patient is feeling anxious or having panic attacks.  Patient has no concerns about alcohol or drug use.     Social History  Tobacco Use    Smoking status: Former Smoker      Types: Cigarettes      Quit date: 2018      Years since quittin.6    Smokeless tobacco: Never Used  Alcohol use: No  Drug use: No      Today's PHQ-9         PHQ-9 Total Score:     (P) 5   PHQ-9 Q9 Thoughts of better off dead/self-harm past 2 weeks :   (P) Not at all   Thoughts of suicide or self harm:      Self-harm Plan:        Self-harm Action:          Safety concerns for self or others:           She eats 2-3 servings of fruits and vegetables daily.She consumes 0 sweetened beverage(s) daily.She exercises with enough effort to increase her heart rate 20 to 29 minutes per day.  She exercises with enough effort to increase her heart rate 7 days per week.   She is taking medications regularly.     Answers for HPI/ROS submitted by the patient on 2020   Chronic problems general questions HPI Form  If you checked off any problems, how difficult have these problems made it for you to do your work, take care of things at home, or get along with other people?: Somewhat difficult  PHQ9 TOTAL SCORE: 5  ROSETTE 7 TOTAL SCORE: 5    PAtient has had migrainse for years, no aura.  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 As did much else, so changing back to day shift. But has had to go in to get imitrex at  "ED and would like to have a few on hand. Takes zofran prior to use to help keep the imitrex down.    -------------------------------------    Patient Active Problem List   Diagnosis     Injury of knee, leg, ankle and foot     S/P LEEP (status post loop electrosurgical excision procedure)- MYNOR 3     Gastroesophageal reflux disease without esophagitis     History of gestational hypertension     Postpartum anxiety     Past Surgical History:   Procedure Laterality Date     APPENDECTOMY       COSMETIC SURGERY      [facility]      LEEP TX, CERVICAL  1/13/15    MYNOR 3 without extension to margins       Social History     Tobacco Use     Smoking status: Former Smoker     Types: Cigarettes     Last attempt to quit: 2018     Years since quittin.6     Smokeless tobacco: Never Used   Substance Use Topics     Alcohol use: No     Family History   Problem Relation Age of Onset     Depression Mother      Hypertension Father            Reviewed and updated as needed this visit by Provider  Tobacco  Allergies  Meds  Problems  Med Hx  Surg Hx  Fam Hx         Review of Systems   ROS COMP: Constitutional, HEENT, cardiovascular, pulmonary, GI, , musculoskeletal, neuro, skin, endocrine and psych systems are negative, except as otherwise noted.      Objective    /70 (BP Location: Left arm, Patient Position: Chair, Cuff Size: Adult Regular)   Pulse 80   Temp 98.3  F (36.8  C) (Temporal)   Resp 16   Ht 1.765 m (5' 9.5\")   Wt 101.6 kg (224 lb)   SpO2 98%   Breastfeeding No   BMI 32.60 kg/m    Body mass index is 32.6 kg/m .  Physical Exam   GENERAL: healthy, alert and no distress  EYES: Eyes grossly normal to inspection, PERRL and conjunctivae and sclerae normal  RESP: lungs clear to auscultation - no rales, rhonchi or wheezes  CV: regular rate and rhythm, normal S1 S2, no S3 or S4, no murmur, click or rub, no peripheral edema and peripheral pulses strong  ABDOMEN: soft, nontender, no hepatosplenomegaly, no " "masses and bowel sounds normal  MS: no gross musculoskeletal defects noted, no edema  SKIN: no suspicious lesions or rashes  NEURO: Normal strength and tone, mentation intact and speech normal  NEURO: CN II-XII grossly intact  PSYCH: mentation appears normal, affect normal/bright            Assessment & Plan       ICD-10-CM    1. Postpartum anxiety O99.345 PARoxetine (PAXIL) 20 MG tablet    F41.8    2. History of gestational hypertension Z87.59 Hemoglobin A1c   3. Nausea and vomiting in pregnancy O21.9    4. Chronic migraine without aura without status migrainosus, not intractable G43.709 SUMAtriptan (IMITREX) 50 MG tablet     ondansetron (ZOFRAN) 8 MG tablet     Mood meds maintained the same, changes appear related to her change in shifts and not sleeping enough. Only plans for 5-6 hours during the day as she is helping with the kids after school and getting them to bed before she goes to work. This will change soon, but asked to try to make more time for her daytime sleep in the meantime to feel better.  Also givne her zofran and imitrex for prn for migraines.     BMI:   Estimated body mass index is 32.6 kg/m  as calculated from the following:    Height as of this encounter: 1.765 m (5' 9.5\").    Weight as of this encounter: 101.6 kg (224 lb).   Weight management plan: Discussed healthy diet and exercise guidelines      Return in about 6 months (around 7/22/2020) for mood.    Tameka Cruz MD, MD  [facility]    "

## 2020-01-22 ENCOUNTER — OFFICE VISIT (OUTPATIENT)
Dept: FAMILY MEDICINE | Facility: OTHER | Age: 35
End: 2020-01-22
Payer: COMMERCIAL

## 2020-01-22 VITALS
TEMPERATURE: 98.3 F | BODY MASS INDEX: 32.07 KG/M2 | SYSTOLIC BLOOD PRESSURE: 118 MMHG | WEIGHT: 224 LBS | HEIGHT: 70 IN | DIASTOLIC BLOOD PRESSURE: 70 MMHG | RESPIRATION RATE: 16 BRPM | OXYGEN SATURATION: 98 % | HEART RATE: 80 BPM

## 2020-01-22 DIAGNOSIS — F41.8 POSTPARTUM ANXIETY: Primary | ICD-10-CM

## 2020-01-22 DIAGNOSIS — Z87.59 HISTORY OF GESTATIONAL HYPERTENSION: ICD-10-CM

## 2020-01-22 DIAGNOSIS — O21.9 NAUSEA AND VOMITING IN PREGNANCY: ICD-10-CM

## 2020-01-22 DIAGNOSIS — G43.709 CHRONIC MIGRAINE WITHOUT AURA WITHOUT STATUS MIGRAINOSUS, NOT INTRACTABLE: ICD-10-CM

## 2020-01-22 LAB — HBA1C MFR BLD: 5.1 % (ref 0–5.6)

## 2020-01-22 PROCEDURE — 83036 HEMOGLOBIN GLYCOSYLATED A1C: CPT | Performed by: FAMILY MEDICINE

## 2020-01-22 PROCEDURE — 36415 COLL VENOUS BLD VENIPUNCTURE: CPT | Performed by: FAMILY MEDICINE

## 2020-01-22 PROCEDURE — 99214 OFFICE O/P EST MOD 30 MIN: CPT | Performed by: FAMILY MEDICINE

## 2020-01-22 RX ORDER — ONDANSETRON 8 MG/1
8 TABLET, FILM COATED ORAL EVERY 8 HOURS PRN
Qty: 30 TABLET | Refills: 0 | Status: SHIPPED | OUTPATIENT
Start: 2020-01-22

## 2020-01-22 RX ORDER — PAROXETINE 20 MG/1
20 TABLET, FILM COATED ORAL DAILY
Qty: 90 TABLET | Refills: 1 | Status: SHIPPED | OUTPATIENT
Start: 2020-01-22

## 2020-01-22 RX ORDER — SUMATRIPTAN 50 MG/1
TABLET, FILM COATED ORAL
Qty: 9 TABLET | Refills: 1 | Status: SHIPPED | OUTPATIENT
Start: 2020-01-22

## 2020-01-22 ASSESSMENT — ANXIETY QUESTIONNAIRES
5. BEING SO RESTLESS THAT IT IS HARD TO SIT STILL: NOT AT ALL
GAD7 TOTAL SCORE: 5
GAD7 TOTAL SCORE: 5
7. FEELING AFRAID AS IF SOMETHING AWFUL MIGHT HAPPEN: NOT AT ALL
3. WORRYING TOO MUCH ABOUT DIFFERENT THINGS: SEVERAL DAYS
7. FEELING AFRAID AS IF SOMETHING AWFUL MIGHT HAPPEN: NOT AT ALL
GAD7 TOTAL SCORE: 5
1. FEELING NERVOUS, ANXIOUS, OR ON EDGE: SEVERAL DAYS
4. TROUBLE RELAXING: SEVERAL DAYS
6. BECOMING EASILY ANNOYED OR IRRITABLE: MORE THAN HALF THE DAYS
2. NOT BEING ABLE TO STOP OR CONTROL WORRYING: NOT AT ALL

## 2020-01-22 ASSESSMENT — PATIENT HEALTH QUESTIONNAIRE - PHQ9
SUM OF ALL RESPONSES TO PHQ QUESTIONS 1-9: 5
10. IF YOU CHECKED OFF ANY PROBLEMS, HOW DIFFICULT HAVE THESE PROBLEMS MADE IT FOR YOU TO DO YOUR WORK, TAKE CARE OF THINGS AT HOME, OR GET ALONG WITH OTHER PEOPLE: SOMEWHAT DIFFICULT
SUM OF ALL RESPONSES TO PHQ QUESTIONS 1-9: 5

## 2020-01-22 ASSESSMENT — MIFFLIN-ST. JEOR: SCORE: 1778.37

## 2020-01-22 NOTE — RESULT ENCOUNTER NOTE
Julianne, your a1c results were normal.  Please let me know if you have any questions.  Tameka Cruz MD

## 2020-01-23 ASSESSMENT — PATIENT HEALTH QUESTIONNAIRE - PHQ9: SUM OF ALL RESPONSES TO PHQ QUESTIONS 1-9: 5

## 2020-01-23 ASSESSMENT — ANXIETY QUESTIONNAIRES: GAD7 TOTAL SCORE: 5

## 2020-01-31 ENCOUNTER — OFFICE VISIT (OUTPATIENT)
Dept: OBGYN | Facility: OTHER | Age: 35
End: 2020-01-31
Payer: COMMERCIAL

## 2020-01-31 VITALS
WEIGHT: 222.5 LBS | SYSTOLIC BLOOD PRESSURE: 108 MMHG | DIASTOLIC BLOOD PRESSURE: 78 MMHG | HEIGHT: 69 IN | HEART RATE: 88 BPM | BODY MASS INDEX: 32.95 KG/M2

## 2020-01-31 DIAGNOSIS — Z00.00 ANNUAL PHYSICAL EXAM: Primary | ICD-10-CM

## 2020-01-31 PROCEDURE — 99395 PREV VISIT EST AGE 18-39: CPT | Performed by: OBSTETRICS & GYNECOLOGY

## 2020-01-31 ASSESSMENT — PATIENT HEALTH QUESTIONNAIRE - PHQ9: SUM OF ALL RESPONSES TO PHQ QUESTIONS 1-9: 5

## 2020-01-31 ASSESSMENT — MIFFLIN-ST. JEOR: SCORE: 1769.5

## 2020-01-31 NOTE — PROGRESS NOTES
Chief Complaint   Patient presents with     Physical       Subjective  Julianne Jenkins is a 34 year old female who presents for her annual exam.  Patient states she is doing well.  She feels like she is ko.  She is taking Paxil.  She is switching to day shift from night shift.  I recommended she follow up with FP if this persists.  She wants to try her new shift before switching medication.  She stopped breast feeding in Nov.  She has not had a menses since then.  I recommend follow up if the amenorrhea persists.  No problems urinating.  Normal bowel movements.  Patient is sexually active.  No dyspareunia.  No vaginal spotting after.  1  and 1 c section.  Patient already received her flu vaccine.  She is using condoms for now.   is considering a vasectomy.    Most recent pap: 2017  History of abnormal Pap smear:  MYNOR 3-LEEP  History of STI's:  No  History of PID:  No    Family history of uterine cancer:  No  Family history of ovarian cancer:  No  Family history of colon cancer:  No  Family history of breast cancer:  Maternal sister-60's    ROS  ROS: 10 point ROS neg other than the symptoms noted above in the HPI.    Past Medical History:   Diagnosis Date     ASCUS with positive high risk HPV 13     ASCUS with positive high risk HPV 14     H/O colposcopy with cervical biopsy 3/5/13    MYNOR 1     H/O colposcopy with cervical biopsy 14    MYNOR 3     LSIL (low grade squamous intraepithelial lesion) on Pap smear 7/19/10     LSIL (low grade squamous intraepithelial lesion) on Pap smear 13    cannot rule out HSIL     Past Surgical History:   Procedure Laterality Date     APPENDECTOMY       COSMETIC SURGERY      Molars      LEEP TX, CERVICAL  1/13/15    MYNOR 3 without extension to margins     Family History   Problem Relation Age of Onset     Depression Mother      Hypertension Father      Social History     Tobacco Use     Smoking status: Former Smoker     Types: Cigarettes     Last  "attempt to quit: 2018     Years since quittin.6     Smokeless tobacco: Never Used   Substance Use Topics     Alcohol use: No       Tobacco abuse:  Rarely  Do you get at least three servings of calcium containing foods daily (dairy, green leafy vegetables, etc.)? yes   Outside of work or daily activities, how many days per week do you exercise for 30 minutes or longer? 3-4x per week  The patient does not drink >3 drinks per day nor >7 drinks per week.   Have you had an eye exam in the past two years? yes   Do you see a dentist twice per year? yes   Today's PHQ-2 Score:   Abuse: Current or Past(Physical, Sexual or Emotional)- No   Do you feel safe in your environment - Yes  Objective  Vitals: /78   Pulse 88   Ht 1.762 m (5' 9.37\")   Wt 100.9 kg (222 lb 8 oz)   LMP 2019   BMI 32.51 kg/m    BMI= Body mass index is 32.51 kg/m .    General appearance=well developed, well-nourished female  Gait=normal  Psych=mood is stable, alert and oriented x3  HEENT=mucous membranes moist  Skin=no rashes or lesions seen,normal turgor   Breast:  Benign exam, no masses palpated.  No skin changes, no axillary lymphadenopathy, no nipple discharge.  Axilla feel completely normal, no lymph node enlargement and non-tender.  Neck=overall appearance is normal  Heart=RRR, no murmurs, no swelling noted  Thyroid=normal, no masses, no TTP, no enlargement  Lungs=non-labored breathing, no use of accessory muscles, clear to ausculation bilaterally  Abd=soft, Nontender/nondistended, +bowel sounds x4, no masses, no signs of hernias, no evidence of hepatosplenomegaly  PELVIC:    External genitalia: normal without lesions or masses  Urethral meatus: no lesions or prolapse noted, normal size  Urethra: no masses, non tender  Bladder: non tender, no fullness  Vagina: normal mucosa and rugae, no discharge.  Cervix: normal without lesion, no cervical motion tenderness, healthy, multiparous  Uterus: small, mobile, nontender.  Adnexa: " non tender, without masses  Rectal: deferred  Ext=no clubbing or cyanosis, no swelling      Last lipid profile:  2016  Regular self breast exam:  Yes  Most recent mammogram:  2016  History of abnormal mammogram:  N/A  Fit testing:  N/A  DEXA:  N/A        Assessment/Plan  1.)  Annual/well woman exam  2.)  Anxiety/depression=fu with FP  3.)  Tobacco abuse=rarely      The following topics were discussed or recommended   Discussed seat belt, helmet and sunscreen use  Vision screening    Calcium/Vitamin D supplement=Recommended 1000 mg of calcium daily and 800 IU of vitamin D.  Contraception     25 minutes was spent face to face with the patient today discussing her history, diagnosis, and follow-up plan as noted above.  Over 50% of the visit was spent in counseling and coordination of care.    Total Visit Time: 30 minutes        Swathi Crisostomo DO

## 2020-01-31 NOTE — PATIENT INSTRUCTIONS
PREVENTIVE HEALTH RECOMMENDATIONS:   Get a physical every year.  A pap test is important to have done starting at age 21 and then every three years as long as your pap is normal.  When you receive the results of your pap test it will include when you need to have your next pap test.    You should be tested each year for chlamydia and gonorrhea if you are aged 16-25 and if you have had a new sexual partner since you were last tested.   Vaccines: Get a flu shot each year.   Eat at least 8-10 servings of fruits and vegetables daily.  Eat whole-grain bread and cereal, whole-wheat pasta and brown rice instead of white grains and white rice.   For bone health: Eat calcium-rich foods (dairy products) or take calcium pills (500 to 600 mg with vitamin D) twice a day with food.   Exercise for an average of 30 minutes a day, 5 days of the week. It can be as simple as taking a brisk walk.  This will help you control your weight and prevent many diseases.   Limit alcohol to one drink per day.   Don't smoke and limit your exposure to second hand smoke.  If you smoke consider making a plan to quit. Go to [employer] and clink on   [employer] [employer]  for help   Wear sunscreen with at least SPF15 to prevent skin damage and skin cancer.   Brush your teeth twice a day and floss once a day and see your dentist twice a year for an exam and cleaning.   Have a great year and I will look forward to seeing you next year.   Swathi Crisostomo, DO

## 2020-03-24 ENCOUNTER — MYC MEDICAL ADVICE (OUTPATIENT)
Dept: FAMILY MEDICINE | Facility: OTHER | Age: 35
End: 2020-03-24

## 2020-10-23 ENCOUNTER — OFFICE VISIT (OUTPATIENT)
Dept: OBGYN | Facility: OTHER | Age: 35
End: 2020-10-23
Payer: COMMERCIAL

## 2020-10-23 VITALS
OXYGEN SATURATION: 100 % | WEIGHT: 223.5 LBS | DIASTOLIC BLOOD PRESSURE: 78 MMHG | HEART RATE: 82 BPM | BODY MASS INDEX: 32.65 KG/M2 | SYSTOLIC BLOOD PRESSURE: 112 MMHG

## 2020-10-23 DIAGNOSIS — F32.81 PMDD (PREMENSTRUAL DYSPHORIC DISORDER): Primary | ICD-10-CM

## 2020-10-23 PROCEDURE — 99214 OFFICE O/P EST MOD 30 MIN: CPT | Performed by: OBSTETRICS & GYNECOLOGY

## 2020-10-23 NOTE — PATIENT INSTRUCTIONS
Please call if you any questions.    [address]   [address]  [phone]        Swathi Crisostomo,

## 2020-10-23 NOTE — PROGRESS NOTES
"Subjective  35 year old non-pregnant female presents today complaining of mood swings during her cycle.  Patient states during her cycle she \"goes crazy\".  Patient was on Zoloft in the past when her mother passed away 5 years ago.  She states it didn't work because she became \"lifeless with no emotions\".  She was then given Paxil after her last delivery for postpartum depression.  She had decreased libido and felt she had no emotions with that too.  She has noticed significant mood swings around the last 6 months or so.  They usually happen right before her menses starts.  Her family notices this difference as well.   has had a vasectomy.  Patient has a history of noctural seizures so was told she should not try Buspar.  We discussed PMDD in detail.  Her menses are regular, but very heavy for three days.  She has a history of anemia.  We discussed possible treatment options for her PMDD.  She is sure she does not want to try any SSRIs.  I discussed oral contractive pills and she is willing to try these. She has an annual exam coming up in Jan so will let me know then.  I recommend psych referral if the oral contractive pills do not help.  She is in agreement with this plan.        ROS: 10 point ROS neg other than the symptoms noted above in the HPI.  Past Medical History:   Diagnosis Date     ASCUS with positive high risk HPV 11/26/13     ASCUS with positive high risk HPV 11/21/14     H/O colposcopy with cervical biopsy 3/5/13    MYNOR 1     H/O colposcopy with cervical biopsy 12/22/14    MYNOR 3     LSIL (low grade squamous intraepithelial lesion) on Pap smear 7/19/10     LSIL (low grade squamous intraepithelial lesion) on Pap smear 2/19/13    cannot rule out HSIL     Past Surgical History:   Procedure Laterality Date     APPENDECTOMY       COSMETIC SURGERY  2010    Molars      LEEP TX, CERVICAL  1/13/15    MYNOR 3 without extension to margins     Family History   Problem Relation Age of Onset     Depression " Mother      Hypertension Father      Social History     Tobacco Use     Smoking status: Former Smoker     Types: Cigarettes     Quit date: 2018     Years since quittin.3     Smokeless tobacco: Never Used   Substance Use Topics     Alcohol use: No         Objective  Vitals: /78 (BP Location: Right arm, Cuff Size: Adult Regular)   Pulse 82   Wt 101.4 kg (223 lb 8 oz)   LMP 10/15/2020 (Approximate)   SpO2 100%   BMI 32.65 kg/m    BMI= Body mass index is 32.65 kg/m .    General appearance=well developed, well-nourished female  Gait=normal  Psych=mood is stable, alert and oriented x3      Assessment  1.)  PMDD      Plan  1.)  Oral contractive pills ordered  2.)  Follow-up in 3 months  3.)  Consider psych referral if signs and symptoms do not improve        Nursing notes read and reviewed    Swathi Crisostomo DO

## 2020-12-06 ENCOUNTER — HEALTH MAINTENANCE LETTER (OUTPATIENT)
Age: 35
End: 2020-12-06

## 2021-04-11 ENCOUNTER — HEALTH MAINTENANCE LETTER (OUTPATIENT)
Age: 36
End: 2021-04-11

## 2021-09-26 ENCOUNTER — HEALTH MAINTENANCE LETTER (OUTPATIENT)
Age: 36
End: 2021-09-26

## 2022-05-07 ENCOUNTER — HEALTH MAINTENANCE LETTER (OUTPATIENT)
Age: 37
End: 2022-05-07

## 2023-04-23 ENCOUNTER — HEALTH MAINTENANCE LETTER (OUTPATIENT)
Age: 38
End: 2023-04-23

## 2023-06-02 ENCOUNTER — HEALTH MAINTENANCE LETTER (OUTPATIENT)
Age: 38
End: 2023-06-02

## 2024-02-10 ENCOUNTER — HEALTH MAINTENANCE LETTER (OUTPATIENT)
Age: 39
End: 2024-02-10